# Patient Record
Sex: FEMALE | Race: OTHER | Employment: OTHER | URBAN - METROPOLITAN AREA
[De-identification: names, ages, dates, MRNs, and addresses within clinical notes are randomized per-mention and may not be internally consistent; named-entity substitution may affect disease eponyms.]

---

## 2017-03-02 ENCOUNTER — OFFICE VISIT (OUTPATIENT)
Dept: INTERNAL MEDICINE | Age: 82
End: 2017-03-02

## 2017-03-02 VITALS
TEMPERATURE: 96.5 F | SYSTOLIC BLOOD PRESSURE: 130 MMHG | HEART RATE: 64 BPM | WEIGHT: 157 LBS | HEIGHT: 60 IN | DIASTOLIC BLOOD PRESSURE: 88 MMHG | BODY MASS INDEX: 30.82 KG/M2

## 2017-03-02 DIAGNOSIS — G89.29 CHRONIC MIDLINE LOW BACK PAIN WITHOUT SCIATICA: Chronic | ICD-10-CM

## 2017-03-02 DIAGNOSIS — M54.50 CHRONIC MIDLINE LOW BACK PAIN WITHOUT SCIATICA: Chronic | ICD-10-CM

## 2017-03-02 DIAGNOSIS — E11.21 CONTROLLED TYPE 2 DIABETES MELLITUS WITH PROTEINURIC DIABETIC NEPHROPATHY (HCC): Primary | Chronic | ICD-10-CM

## 2017-03-02 LAB
GLUCOSE BLD-MCNC: 171 MG/DL
HBA1C MFR BLD: 7.9 %

## 2017-03-02 PROCEDURE — 99213 OFFICE O/P EST LOW 20 MIN: CPT | Performed by: INTERNAL MEDICINE

## 2017-03-02 PROCEDURE — 82962 GLUCOSE BLOOD TEST: CPT | Performed by: INTERNAL MEDICINE

## 2017-03-02 PROCEDURE — 83036 HEMOGLOBIN GLYCOSYLATED A1C: CPT | Performed by: INTERNAL MEDICINE

## 2017-03-02 RX ORDER — LEVOTHYROXINE SODIUM 112 UG/1
TABLET ORAL
Qty: 90 TABLET | Refills: 1 | Status: SHIPPED | OUTPATIENT
Start: 2017-03-02 | End: 2017-11-04 | Stop reason: SDUPTHER

## 2017-03-02 RX ORDER — ISOSORBIDE MONONITRATE 120 MG/1
120 TABLET, EXTENDED RELEASE ORAL DAILY
Qty: 90 TABLET | Refills: 1 | Status: SHIPPED | OUTPATIENT
Start: 2017-03-02 | End: 2019-01-23 | Stop reason: SDUPTHER

## 2017-03-02 RX ORDER — CLONIDINE HYDROCHLORIDE 0.3 MG/1
TABLET ORAL
Qty: 180 TABLET | Refills: 1 | Status: SHIPPED | OUTPATIENT
Start: 2017-03-02 | End: 2017-07-03 | Stop reason: SDUPTHER

## 2017-03-02 RX ORDER — LOSARTAN POTASSIUM 50 MG/1
50 TABLET ORAL DAILY
COMMUNITY
End: 2017-07-03 | Stop reason: ALTCHOICE

## 2017-03-02 RX ORDER — ASPIRIN 81 MG/1
TABLET ORAL
Qty: 180 TABLET | Refills: 2 | Status: SHIPPED | OUTPATIENT
Start: 2017-03-02 | End: 2020-07-02 | Stop reason: SDUPTHER

## 2017-03-02 RX ORDER — FUROSEMIDE 20 MG/1
TABLET ORAL
Qty: 90 TABLET | Refills: 1 | Status: SHIPPED | OUTPATIENT
Start: 2017-03-02 | End: 2017-11-04 | Stop reason: SDUPTHER

## 2017-03-02 ASSESSMENT — ENCOUNTER SYMPTOMS
SHORTNESS OF BREATH: 1
BOWEL INCONTINENCE: 0
ANAL BLEEDING: 0
WHEEZING: 0
BACK PAIN: 1
EYE PAIN: 0
ABDOMINAL PAIN: 0
TROUBLE SWALLOWING: 0
CHEST TIGHTNESS: 0

## 2017-04-17 LAB
CHOLESTEROL, TOTAL: 163 MG/DL
CHOLESTEROL/HDL RATIO: NORMAL
HDLC SERPL-MCNC: 50 MG/DL (ref 35–70)
LDL CHOLESTEROL CALCULATED: 79 MG/DL (ref 0–160)
TRIGL SERPL-MCNC: 170 MG/DL
VLDLC SERPL CALC-MCNC: NORMAL MG/DL

## 2017-05-04 ENCOUNTER — HOSPITAL ENCOUNTER (OUTPATIENT)
Dept: SLEEP CENTER | Age: 82
Discharge: HOME | End: 2017-05-04
Payer: COMMERCIAL

## 2017-05-04 PROCEDURE — 95810 POLYSOM 6/> YRS 4/> PARAM: CPT

## 2017-05-26 ENCOUNTER — HOSPITAL ENCOUNTER (OUTPATIENT)
Dept: SLEEP CENTER | Age: 82
Discharge: HOME | End: 2017-05-26
Payer: COMMERCIAL

## 2017-05-26 PROCEDURE — 95811 POLYSOM 6/>YRS CPAP 4/> PARM: CPT

## 2017-07-03 ENCOUNTER — OFFICE VISIT (OUTPATIENT)
Dept: INTERNAL MEDICINE | Age: 82
End: 2017-07-03

## 2017-07-03 VITALS
BODY MASS INDEX: 30.23 KG/M2 | HEART RATE: 57 BPM | OXYGEN SATURATION: 94 % | TEMPERATURE: 96.2 F | HEIGHT: 60 IN | DIASTOLIC BLOOD PRESSURE: 52 MMHG | WEIGHT: 154 LBS | SYSTOLIC BLOOD PRESSURE: 100 MMHG

## 2017-07-03 DIAGNOSIS — G89.29 CHRONIC BILATERAL LOW BACK PAIN WITHOUT SCIATICA: ICD-10-CM

## 2017-07-03 DIAGNOSIS — Z23 NEED FOR VACCINATION WITH 13-POLYVALENT PNEUMOCOCCAL CONJUGATE VACCINE: ICD-10-CM

## 2017-07-03 DIAGNOSIS — M54.50 CHRONIC BILATERAL LOW BACK PAIN WITHOUT SCIATICA: ICD-10-CM

## 2017-07-03 DIAGNOSIS — Z85.3 PERSONAL HISTORY OF MALIGNANT NEOPLASM OF BREAST: ICD-10-CM

## 2017-07-03 DIAGNOSIS — E11.21 CONTROLLED TYPE 2 DIABETES MELLITUS WITH PROTEINURIC DIABETIC NEPHROPATHY (HCC): Primary | ICD-10-CM

## 2017-07-03 DIAGNOSIS — M47.817 SPONDYLOSIS OF LUMBOSACRAL REGION WITHOUT MYELOPATHY OR RADICULOPATHY: ICD-10-CM

## 2017-07-03 DIAGNOSIS — Z90.12 S/P LEFT MASTECTOMY: ICD-10-CM

## 2017-07-03 LAB
GLUCOSE BLD-MCNC: 232 MG/DL
HBA1C MFR BLD: 7.2 %

## 2017-07-03 PROCEDURE — 83036 HEMOGLOBIN GLYCOSYLATED A1C: CPT | Performed by: INTERNAL MEDICINE

## 2017-07-03 PROCEDURE — 82962 GLUCOSE BLOOD TEST: CPT | Performed by: INTERNAL MEDICINE

## 2017-07-03 PROCEDURE — 90471 IMMUNIZATION ADMIN: CPT | Performed by: INTERNAL MEDICINE

## 2017-07-03 PROCEDURE — 99214 OFFICE O/P EST MOD 30 MIN: CPT | Performed by: INTERNAL MEDICINE

## 2017-07-03 PROCEDURE — 90670 PCV13 VACCINE IM: CPT | Performed by: INTERNAL MEDICINE

## 2017-07-03 RX ORDER — CLONIDINE HYDROCHLORIDE 0.3 MG/1
TABLET ORAL
Qty: 90 TABLET | Refills: 1
Start: 2017-07-03 | End: 2017-11-07

## 2017-07-03 ASSESSMENT — ENCOUNTER SYMPTOMS
ABDOMINAL PAIN: 0
BOWEL INCONTINENCE: 0
CHEST TIGHTNESS: 0
ANAL BLEEDING: 0
BACK PAIN: 1
EYE PAIN: 0
WHEEZING: 0
TROUBLE SWALLOWING: 0
SHORTNESS OF BREATH: 1

## 2017-07-03 ASSESSMENT — PATIENT HEALTH QUESTIONNAIRE - PHQ9
2. FEELING DOWN, DEPRESSED OR HOPELESS: 0
SUM OF ALL RESPONSES TO PHQ QUESTIONS 1-9: 0
SUM OF ALL RESPONSES TO PHQ9 QUESTIONS 1 & 2: 0
1. LITTLE INTEREST OR PLEASURE IN DOING THINGS: 0

## 2017-07-25 ENCOUNTER — HOSPITAL ENCOUNTER (OUTPATIENT)
Dept: ULTRASOUND IMAGING | Age: 82
End: 2017-07-25
Payer: COMMERCIAL

## 2017-07-25 ENCOUNTER — HOSPITAL ENCOUNTER (OUTPATIENT)
Dept: WOMENS IMAGING | Age: 82
Discharge: HOME | End: 2017-07-25
Payer: COMMERCIAL

## 2017-07-25 DIAGNOSIS — Z90.12 S/P LEFT MASTECTOMY: ICD-10-CM

## 2017-07-25 DIAGNOSIS — Z85.3 PERSONAL HISTORY OF MALIGNANT NEOPLASM OF BREAST: ICD-10-CM

## 2017-07-25 PROCEDURE — G0206 DX MAMMO INCL CAD UNI: HCPCS

## 2017-08-16 LAB
ANION GAP SERPL CALCULATED.3IONS-SCNC: 14 MEQ/L (ref 7–13)
BUN BLDV-MCNC: 22 MG/DL (ref 8–23)
CHLORIDE BLD-SCNC: 107 MEQ/L (ref 98–107)
CHOLESTEROL, TOTAL: 129 MG/DL (ref 0–199)
CO2: 25 MEQ/L (ref 22–29)
CREAT SERPL-MCNC: 1.13 MG/DL (ref 0.5–0.9)
GFR AFRICAN AMERICAN: 55.7
GFR NON-AFRICAN AMERICAN: 46
HDLC SERPL-MCNC: 60 MG/DL (ref 40–59)
LDL CHOLESTEROL CALCULATED: 40 MG/DL (ref 0–129)
POTASSIUM SERPL-SCNC: 4.8 MEQ/L (ref 3.5–5.1)
SODIUM BLD-SCNC: 146 MEQ/L (ref 132–144)
TOTAL CK: 51 U/L (ref 0–170)
TRIGL SERPL-MCNC: 143 MG/DL (ref 0–200)

## 2017-10-13 RX ORDER — FAMOTIDINE 40 MG/1
TABLET, FILM COATED ORAL
Qty: 30 TABLET | Refills: 3 | Status: SHIPPED | OUTPATIENT
Start: 2017-10-13 | End: 2018-02-08 | Stop reason: SDUPTHER

## 2017-11-03 ENCOUNTER — OFFICE VISIT (OUTPATIENT)
Dept: INTERNAL MEDICINE | Age: 82
End: 2017-11-03

## 2017-11-03 VITALS
BODY MASS INDEX: 30.04 KG/M2 | TEMPERATURE: 96.5 F | HEART RATE: 58 BPM | HEIGHT: 60 IN | SYSTOLIC BLOOD PRESSURE: 138 MMHG | DIASTOLIC BLOOD PRESSURE: 82 MMHG | WEIGHT: 153 LBS | OXYGEN SATURATION: 91 %

## 2017-11-03 DIAGNOSIS — I10 ESSENTIAL HYPERTENSION: ICD-10-CM

## 2017-11-03 DIAGNOSIS — E11.21 CONTROLLED TYPE 2 DIABETES MELLITUS WITH PROTEINURIC DIABETIC NEPHROPATHY (HCC): Primary | ICD-10-CM

## 2017-11-03 DIAGNOSIS — G89.29 ACUTE EXACERBATION OF CHRONIC LOW BACK PAIN: ICD-10-CM

## 2017-11-03 DIAGNOSIS — M54.50 ACUTE EXACERBATION OF CHRONIC LOW BACK PAIN: ICD-10-CM

## 2017-11-03 PROCEDURE — G8427 DOCREV CUR MEDS BY ELIG CLIN: HCPCS | Performed by: INTERNAL MEDICINE

## 2017-11-03 PROCEDURE — 99213 OFFICE O/P EST LOW 20 MIN: CPT | Performed by: INTERNAL MEDICINE

## 2017-11-03 PROCEDURE — 1123F ACP DISCUSS/DSCN MKR DOCD: CPT | Performed by: INTERNAL MEDICINE

## 2017-11-03 PROCEDURE — G8399 PT W/DXA RESULTS DOCUMENT: HCPCS | Performed by: INTERNAL MEDICINE

## 2017-11-03 PROCEDURE — 1090F PRES/ABSN URINE INCON ASSESS: CPT | Performed by: INTERNAL MEDICINE

## 2017-11-03 PROCEDURE — G8417 CALC BMI ABV UP PARAM F/U: HCPCS | Performed by: INTERNAL MEDICINE

## 2017-11-03 PROCEDURE — G8484 FLU IMMUNIZE NO ADMIN: HCPCS | Performed by: INTERNAL MEDICINE

## 2017-11-03 PROCEDURE — G8598 ASA/ANTIPLAT THER USED: HCPCS | Performed by: INTERNAL MEDICINE

## 2017-11-03 PROCEDURE — 1036F TOBACCO NON-USER: CPT | Performed by: INTERNAL MEDICINE

## 2017-11-03 PROCEDURE — 4040F PNEUMOC VAC/ADMIN/RCVD: CPT | Performed by: INTERNAL MEDICINE

## 2017-11-03 RX ORDER — LISINOPRIL 20 MG/1
20 TABLET ORAL 2 TIMES DAILY
Qty: 180 TABLET | Refills: 1 | Status: SHIPPED | OUTPATIENT
Start: 2017-11-03 | End: 2018-03-09 | Stop reason: SDUPTHER

## 2017-11-03 RX ORDER — AMLODIPINE BESYLATE 5 MG/1
5 TABLET ORAL DAILY
Qty: 90 TABLET | Refills: 1 | Status: SHIPPED | OUTPATIENT
Start: 2017-11-03 | End: 2018-04-29 | Stop reason: SDUPTHER

## 2017-11-03 ASSESSMENT — ENCOUNTER SYMPTOMS
ABDOMINAL PAIN: 0
BACK PAIN: 1
CHEST TIGHTNESS: 0
EYE PAIN: 0
ORTHOPNEA: 0
ANAL BLEEDING: 0
BOWEL INCONTINENCE: 0
SHORTNESS OF BREATH: 0
TROUBLE SWALLOWING: 0
BLURRED VISION: 0
WHEEZING: 0

## 2017-11-03 NOTE — PROGRESS NOTES
Manjeet Kessler is a 80 y.o. female who presents with     Chief Complaint   Patient presents with    Diabetes     random office , A1C 7.2    Back Pain    Hypertension       The following laboratory reports since the past visit were reviewed (the ones pertinent to today's visit were discussed with the patient):    Orders Only on 08/16/2017   Component Date Value Ref Range Status    BUN 08/16/2017 22  8 - 23 mg/dL Final    CREATININE 08/16/2017 1.13* 0.50 - 0.90 mg/dL Final    GFR Non- 08/16/2017 46.0* >60 Final    Comment: >60 mL/min/1.73m2 EGFR, calc. for ages 25 and older using the  MDRD formula (not corrected for weight), is valid for stable  renal function.  GFR  08/16/2017 55.7* >60 Final    Comment: >60 mL/min/1.73m2 EGFR, calc. for ages 25 and older using the  MDRD formula (not corrected for weight), is valid for stable  renal function.  Sodium 08/16/2017 146* 132 - 144 mEq/L Final    Potassium 08/16/2017 4.8  3.5 - 5.1 mEq/L Final    Chloride 08/16/2017 107  98 - 107 mEq/L Final    CO2 08/16/2017 25  22 - 29 mEq/L Final    Anion Gap 08/16/2017 14* 7 - 13 mEq/L Final   Orders Only on 08/16/2017   Component Date Value Ref Range Status    Total CK 08/16/2017 51  0 - 170 U/L Final   Orders Only on 08/16/2017   Component Date Value Ref Range Status    Cholesterol, Total 08/16/2017 129  0 - 199 mg/dL Final    Triglycerides 08/16/2017 143  0 - 200 mg/dL Final    HDL 08/16/2017 60* 40 - 59 mg/dL Final    Comment: ATP III HDL Cholesterol Classification is high. Expected Values:    Males:    >55 = No Risk            35-55 = Moderate Risk            <35 = High Risk    Females:  >65 = No Risk            45-65 = Moderate Risk            <45 = High Risk    NCEP Guidelines:   Third Report May 2001  >59 = negative risk factor for CHD  <40 = major risk factor for CHD      LDL Calculated 08/16/2017 40  0 - 129 mg/dL Final       HPI:    Diabetes   She presents dysuria, headaches, paresthesias, pelvic pain, weakness or weight loss. Risk factors include history of osteoporosis, menopause, obesity, sedentary lifestyle and lack of exercise. She has tried bed rest and NSAIDs for the symptoms. The treatment provided no relief. Hypertension   This is a chronic problem. The current episode started more than 1 year ago. The problem is unchanged. The problem is controlled. Associated symptoms include anxiety. Pertinent negatives include no blurred vision, chest pain, headaches, neck pain, orthopnea, palpitations, peripheral edema, PND or shortness of breath. Agents associated with hypertension include thyroid hormones. Risk factors for coronary artery disease include diabetes mellitus, post-menopausal state and sedentary lifestyle. Past treatments include direct vasodilators, diuretics, calcium channel blockers, central alpha agonists and ACE inhibitors. The current treatment provides significant improvement. Compliance problems include diet, psychosocial issues and exercise. Hypertensive end-organ damage includes CAD/MI. There is no history of angina, CVA or renovascular disease. There is no history of chronic renal disease or sleep apnea. More detail above in the chief complaint(s) and below in the review of systems.      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     Diabetes mellitus type II     Emphyse[facility] lung ([facility] [address]     H/O colonoscopy 10/02/2012    dr Iliana Prakash H/O mammogram ,     HTN (hypertension)     Learning disability     on SSI    Osteopenia     S/P left mastectomy 1985    cancer, no rx    Senile osteopenia     Tubular adenoma of colon        Past Surgical History:   Procedure Laterality Date     SECTION  x 3    MASTECTOMY, RADICAL Left     breast cancer       Social History     Social History    Glucose Monitoring Suppl (FREESTYLE FREEDOM LITE) W/DEVICE KIT 1 kit by Does not apply route daily 1 kit 0    Cyanocobalamin (VITAMIN B-12 PO) Take  by mouth daily.  nystatin (MYCOSTATIN) 849370 UNIT/GM ointment Apply topically 2 times daily. 30 g 1    Omega-3 Fatty Acids (OMEGA 3 PO) Take 120 mg by mouth daily.  fluticasone (FLONASE) 50 MCG/ACT nasal spray 2 sprays by Nasal route daily.  nitroGLYCERIN (NITROQUICK) 0.4 MG SL tablet Place 1 tablet under the tongue every 5 minutes as needed. 25 tablet 6    diclofenac sodium 1 % GEL Apply 2 g topically 2 times daily. Apply to affected area(s) bid as directed       Calcium Carb-Cholecalciferol (OYSTER SHELL CALCIUM + D PO) Take 1 tablet by mouth daily.  ONE TOUCH ULTRASOFT LANCETS Test once daily and as directed       vitamin D (CHOLECALCIFEROL) 1000 UNIT TABS tablet Take 1,000 Int'l Units by mouth daily. No current facility-administered medications on file prior to visit. Review of Systems   Constitutional: Positive for fatigue (exertional). Negative for activity change, appetite change, unexpected weight change and weight loss. HENT: Negative for nosebleeds and trouble swallowing. Eyes: Negative for blurred vision and pain. Respiratory: Negative for chest tightness, shortness of breath and wheezing. Cardiovascular: Negative. Negative for chest pain, palpitations, orthopnea, leg swelling and PND. Gastrointestinal: Negative for abdominal pain, anal bleeding and bowel incontinence. Endocrine: Negative for cold intolerance, heat intolerance, polydipsia and polyuria. Genitourinary: Positive for frequency. Negative for bladder incontinence, dysuria, hematuria and pelvic pain. Musculoskeletal: Positive for back pain. Negative for arthralgias, myalgias and neck pain. Skin: Negative for rash. Neurological: Positive for light-headedness (Occasional, blood pressure has been low).  Negative for dizziness, tremors, today for diabetes, back pain and hypertension. 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 Also, topical salicylate ointment should be used for massage    Essential hypertension                 Stable on medication, to focus on lifestyle especially weight loss      Other orders  -     lisinopril (PRINIVIL;ZESTRIL) 20 MG tablet; Take 1 tablet by mouth 2 times daily  -     amLODIPine (NORVASC) 5 MG tablet; Take 1 tablet by mouth daily  -     diclofenac sodium 1 % GEL; Apply 2 g topically 4 times daily as needed for Pain    Plan:    Reviewed with the patient (/and caregiver if present): current health status, medications, activities and diet. See also orders and comments in the assessment section. Today's diagnosis (in the context of chronic problems) was discussed with patient (/and caregiver if present), questions answered. Diabetes Counseling   Patient was again counseled regarding disease risks and need to maintain a healthy lifestyle, check blood sugar daily at home and keep log, watch blood pressure, caloric intake, weight and physical activity. Side effects, adverse effects of the medication prescribed (or refilled) today, as well as treatment plan/ rationale and result expectations have (again) been discussed with the patient who expresses understanding and consents to proceed as outlined above. Additional advise included in the \"after visit summary\".        Orders Placed This Encounter   Medications    lisinopril (PRINIVIL;ZESTRIL) 20 MG tablet     Sig: Take 1 tablet by mouth 2 times daily     Dispense:  180 tablet     Refill:  1    amLODIPine (NORVASC) 5 MG tablet     Sig: Take 1 tablet by mouth daily     Dispense:  90 tablet Refill:  1    diclofenac sodium 1 % GEL     Sig: Apply 2 g topically 4 times daily as needed for Pain     Dispense:  2 Tube     Refill:  3     Further workup and plan will be determined based on clinical progression and follow up test/ treatment results. t  Close follow up needed to evaluate treatment results and for care coordination. Return in about 4 months (around 3/3/2018). I have reviewed the patient's medical and surgical, family and social history, health maintenance schedule, and updated the computerized patient record. Please note this report has been partially produced by using speech recognition hardware. It may contain errors related to the system, including grammar, punctuation and spelling as well as words and phrases that may seem inaccurate. For any questions or concerns, please feel free to contact me for clarification.         Electronically signed by Kiara Romero MD

## 2017-11-03 NOTE — PATIENT INSTRUCTIONS
Patient Education        Lavanda Rideau del dolor de espalda - [ Mallory Robins About Relief for Back Pain ]  ¿Qué son la tensión y la distensión en la espalda? La distensión en la espalda ocurre cuando usted estira [place], o fuerza, un músculo de la espalda. Usted puede lesionarse la espalda en un accidente o cuando hace ejercicio o levanta algo. La mayoría de los symone de espalda mejoran con reposo y con el tiempo. Usted puede cuidarse en el hogar para ayudar a que ferraro espalda sane. ¿Qué es lo fernando que puede hacer para aliviar el dolor de espalda? Cuando empiece a sentir dolor de espalda, siga estos pasos:  · Camine. Dé un paseo corto (10 a 20 minutos) sobre prosper superficie plana (sin pendientes, donde no haya colinas o escaleras) cada 2 o 3 horas. Solo camine distancias que pueda manejar sin dolor, especialmente dolor en las piernas. · Relájese. Encuentre prosper posición cómoda para descansar. Algunas personas se sienten cómodas en el suelo o en prosper cama de firmeza media con prosper almohada pequeña debajo de la talha y otra debajo de las rodillas. Otras prefieren acostarse de lado con prosper almohada entre las rodillas. No permanezca en prosper posición por [employer]. · Pruebe con calor o hielo. Trate de [employer] almohadilla térmica a baja o media temperatura, o tome prosper ducha tibia de 15 o 20 minutos cada 2 o 3 horas. O puede comprar vendas térmicas desechables que se usan prosper sarah vez por hasta 8 horas. También puede probar compresas de hielo entre 10 y 15 minutos cada 2 o 3 horas. Puede usar prosper compresa de hielo o prosper bolsa de verduras congeladas envuelta en prosper toalla delgada. No existe evidencia sólida de que el calor o el hielo ayuden, oleg puede probarlos para zohra si son de [place]. También podría convenirle probar alternar [employer] frío y el calor. · [employer] analgésicos (medicamentos para el dolor) exactamente según las indicaciones.   ¨ Si el médico le recetó un analgésico, tómelo según causas posibles. Con frecuencia, está relacionado con problemas en los músculos y ligamentos de la espalda. También podría estar relacionado con problemas de los nervios, discos o huesos de la espalda. Moverse, levantar algo, ponerse de pie, sentarse o dormir de [employer] incómoda pueden forzar la espalda. Algunas veces no se da cuenta de que tiene prosper lesión [employer] tarde. La artritis es otra causa común del dolor de espalda. Aunque kamaljit vez duela mucho, el dolor de espalda por lo general mejora por sí mismo en varias semanas. [address] personas se recuperan en 12 semanas o menos. Hacer un buen tratamiento en el hogar y tener cuidado de no forzar la espalda puede ayudar a que se sienta mejor más pronto. La atención de seguimiento es prosper parte clave de ferraro tratamiento y seguridad. Asegúrese de hacer y acudir a todas las citas, y llame a ferraro médico si está teniendo problemas. También es prosper buena idea saber los resultados de los exámenes y mantener prosper lista de los medicamentos que valerie. ¿Cómo puede cuidarse en el hogar? · Siéntese o acuéstese en las posiciones más cómodas y que reduzcan el dolor. Trate prosper de estas posiciones al acostarse:  ¨ Acuéstese boca arriba con las rodillas dobladas y apoyadas sobre almohadones grandes. ¨ Acuéstese en el piso con las piernas sobre el asiento de un sofá o prosper silla. ¨ Acuéstese de lado con las rodillas y caderas dobladas y [place] entre las piernas. ¨ Acuéstese boca abajo siempre que esta posición no empeore el dolor. · No se siente en la cama y evite los sofás blandos y las posiciones torcidas. El reposo en cama puede aliviar el dolor al principio, oleg retrasa la sanación. Evite reposar en la cama después del primer día de dolor de espalda. · Cambie de posición cada 30 minutos. Si debe sentarse por [employer], párese y descanse de estar sentado. Levántese y camine, o acuéstese en prosper posición cómoda.   · Pruebe usar prosper almohadilla térmica a temperatura Care Instructions ]. \"     Si no tiene prosper cuenta, robinson clic en el enlace \"Sign Up Now\". Revisado: [address], 2017  Versión del contenido: 11.3  © 9369-6379 Healthwise, Incorporated. Las instrucciones de cuidado fueron adaptadas bajo licencia por [facility] ([facility]). Si usted tiene [place] afección médica o sobre estas instrucciones, siempre pregunte a ferraro profesional de boni. Healthwise, Incorporated niega toda garantía o responsabilidad por ferraro uso de esta información. Patient Education        Estiramientos de la espalda: Ejercicios - [ Back Stretches: Exercises ]  Instrucciones de cuidado  Aquí se presentan algunos ejemplos de ejercicios para estiramiento de la espalda. Empiece cada ejercicio lentamente. Reduzca la intensidad del ejercicio si Krystian Skates a tener dolor. Ferraro médico o fisioterapeuta le dirán cuándo puede comenzar con estos ejercicios y cuáles funcionarán mejor para usted. Cómo hacer los ejercicios   Estiramiento sobre la talha    1. Párese cómodamente y separe los pies a la distancia de los hombros.  2. Suzzane Brunner aderadhate, levante ambos brazos sobre ferraro Severiano Hoit y trate de alcanzar el techo. No deje que la talha se incline [place] atrás. 3. Mantenga la posición neha 15 a 30 segundos y [employer] a los lados. 4. Repita de 2 a 4 veces. Estiramiento lateral    1. Párese cómodamente y separe los pies a la distancia de los hombros.  2. Yann Dunker un brazo sobre la talha y luego inclínese hacia el otro lado. 3. Deslice ferraro mano por la pierna mientras jeremy que el peso de ferraro brazo estire suavemente los músculos laterales. Mantenga la posición entre 15 y 27 segundos. 4. Repita de 2 a 4 veces de cada lado. Lagartijas    1. Acuéstese boca abajo, apoyando ferraro cuerpo con los antebrazos. 2. Robinson presión con los codos en el piso para elevar la espalda. Al hacer esto, relaje los músculos del estómago y permita que ferraro espalda se arquee sin usar los músculos de ferraro espalda.  Al hacer presión, evite que ezekiel caderas o la pelvis se separen del piso. 3. Mantenga la posición neha 15 a 30 segundos y luego relájese. 4. Repita de 2 a 4 veces. Relajamiento y descanso    1. Acuéstese boca arriba con prosper toalla enrollada debajo de ferraro regina y [employer] almohada debajo de las rodillas. Extienda los brazos cómodamente a los lados. 2. Relájese y respire con normalidad. 3. Permanezca en esta posición neha unos 10 minutos. 4. Si quiere, puede hacer [employer] 2 y 3 veces al día. La atención de seguimiento es prosper parte clave de ferraro tratamiento y seguridad. Asegúrese de hacer y acudir a todas las citas, y llame a ferraro médico si está teniendo problemas. También es prosper buena idea saber los resultados de los exámenes y mantener prosper lista de los medicamentos que valerie. ¿Dónde puede encontrar más información en [facility]? Kalyan Browne a [email or web address] e ingrese a ferraro cuenta de MyChart. Karthikeyan Bautista [id number] en el Cloyce Javier \"Search Health Information\" para más información (en inglés) sobre \"Estiramientos de la espalda: Ejercicios - [ Back Stretches: Exercises ]. \"     Si no tiene prosper cuenta, mohamud clic en el enlace \"Sign Up Now\". Revisado: [address], 2017  Versión del contenido: 11.3  © 2913-8623 [employer], Incorporated. Las instrucciones de cuidado fueron adaptadas bajo licencia por [facility] ([facility]). Si usted tiene [place] afección médica o sobre estas instrucciones, siempre pregunte a ferraro profesional de boni. [facility], Incorporated niega toda garantía o responsabilidad por ferraro uso de esta información. Patient Education        Cómo volver a la normalidad después de un dolor en la parte baja de la espalda: Instrucciones de cuidado - [ Getting Back to Normal After Low Back Pain: Care Instructions ]  Instrucciones de cuidado  Karnia todo el isiah tiene alguna vez dolor en la parte baja de la espalda.  La buena noticia es que con algunos cuidados personales básicos, la mayoría de los symone en la parte baja de la media de la espalda    Nota: Si usted tiene dolor en la rodilla, no mohamud oleksandr ejercicio. 5. Arrodíllese en el piso y [employer] tobillos. 6. Inclínese hacia adelante, ponga betty latrell sobre el piso y estire betty brazos hacia el frente. Descanse ferraro talha entre betty brazos. 7. Empuje suavemente con ferraro pecho [employer], llegando lo más lejos hacia el frente que pueda. 8. Mantenga la posición entre 15 y 27 segundos. 9. Repita de 2 a 4 veces. Rotación de hombros    5. Siéntese cómodamente y separe los pies a la distancia de los hombros. También puede hacer oleksandr ejercicio estando de pie. 6. [place] los hombros Pax, Ayon Haydee atrás y posteriormente hacia abajo en un movimiento suave y circular. 7. Repita de 2 a 4 veces. Lagartija de pared    5. Párese frente a prosper pared, con los pies de 12 a 24 pulgadas (entre 30 y 61 centímetros) de la pared. Si siente algún dolor al hacer oleksandr ejercicio, párese más cerca de la pared. 6. Ponga betty latrell sobre la pared ligeramente más separadas que betty hombros e inclínese hacia adelante. 7. Incline ferraro cuerpo suavemente hacia la pared. Luego empuje para regresar a la posición [address] y [place] aleksandra. 8. Repita de 8 a 12 veces. Juntar los omóplatos con resistencia    Nota: Para oleksandr ejercicio, usted necesitará materiales elásticos para ejercicio, kasandra un tubo quirúrgico o [employer]ra[employer]Band. 1. Siéntese o póngase de pie, manteniendo la marie en ambas latrell al frente de usted. Mantenga betty codos cerca de los costados, doblados en un ángulo de 90 grados. Betty noemí deben estar Pax. 2. Comprima los omóplatos y [employer] brazos hacia afuera, estirando la marie. Asegúrese de que betty codos estén a betty costados mientras lo hace. 3. Relájese. 4. Repita de 8 a 12 veces. Remar con resistencia    Nota: Para oleksandr ejercicio, usted necesitará materiales elásticos para ejercicio, kasandra un tubo quirúrgico o [employer].   [facility] this, relax your stomach muscles and allow your back to arch without using your back muscles. As your press up, do not let your hips or pelvis come off the floor. 12. Hold for 15 to 30 seconds, then relax. 13. Repeat 2 to 4 times. Alternate arm and leg (bird dog) exercise    Note: Do this exercise slowly. Try to keep your body straight at all times, and do not let one hip drop lower than the other. 10. Start on the floor, on your hands and knees. 11. Tighten your belly muscles. 12. Raise one leg off the floor, and hold it straight out behind you. Be careful not to let your hip drop down, because that will twist your trunk. 13. Hold for about 6 seconds, then lower your leg and switch to the other leg. 14. Repeat 8 to 12 times on each leg. 15. Over time, work up to holding for 10 to 30 seconds each time. 16. If you feel stable and secure with your leg raised, try raising the opposite arm straight out in front of you at the same time. Knee-to-chest exercise    8. Lie on your back with your knees bent and your feet flat on the floor. 9. Bring one knee to your chest, keeping the other foot flat on the floor (or keeping the other leg straight, whichever feels better on your lower back). 10. Keep your lower back pressed to the floor. Hold for at least 15 to 30 seconds. 11. Relax, and lower the knee to the starting position. 12. Repeat with the other leg. Repeat 2 to 4 times with each leg. 13. To get more stretch, put your other leg flat on the floor while pulling your knee to your chest.  Curl-ups    9. Lie on the floor on your back with your knees bent at a 90-degree angle. Your feet should be flat on the floor, about 12 inches from your buttocks. 10. Cross your arms over your chest. If this bothers your neck, try putting your hands behind your neck (not your head), with your elbows spread apart. 11. Slowly tighten your belly muscles and raise your shoulder blades off the floor.   12. Keep your head in line with your body, and do not press your chin to your chest.  13. Hold this position for 1 or 2 seconds, then slowly lower yourself back down to the floor. 14. Repeat 8 to 12 times. Pelvic tilt exercise    5. Lie on your back with your knees bent. 6. \"Brace\" your stomach. This means to tighten your muscles by pulling in and imagining your belly button moving toward your spine. You should feel like your back is pressing to the floor and your hips and pelvis are rocking back. 7. Hold for about 6 seconds while you breathe smoothly. 8. Repeat 8 to 12 times. Heel dig bridging    4. Lie on your back with both knees bent and your ankles bent so that only your heels are digging into the floor. Your knees should be bent about 90 degrees. 5. Then push your heels into the floor, squeeze your buttocks, and lift your hips off the floor until your shoulders, hips, and knees are all in a straight line. 6. Hold for about 6 seconds as you continue to breathe normally, and then slowly lower your hips back down to the floor and rest for up to 10 seconds. 7. Do 8 to 12 repetitions. Hamstring stretch in doorway    1. Lie on your back in a doorway, with one leg through the open door. 2. Slide your leg up the wall to straighten your knee. You should feel a gentle stretch down the back of your leg. 3. Hold the stretch for at least 15 to 30 seconds. Do not arch your back, point your toes, or bend either knee. Keep one heel touching the floor and the other heel touching the wall. 4. Repeat with your other leg. 5. Do 2 to 4 times for each leg. Hip flexor stretch    1. Kneel on the floor with one knee bent and one leg behind you. Place your forward knee over your foot. Keep your other knee touching the floor. 2. Slowly push your hips forward until you feel a stretch in the upper thigh of your rear leg. 3. Hold the stretch for at least 15 to 30 seconds. Repeat with your other leg. 4. Do 2 to 4 times on each side.   Elmer Godoy sit    1. Stand with your back 10 to 12 inches away from a wall. 2. Lean into the wall until your back is flat against it. 3. Slowly slide down until your knees are slightly bent, pressing your lower back into the wall. 4. Hold for about 6 seconds, then slide back up the wall. 5. Repeat 8 to 12 times. Follow-up care is a key part of your treatment and safety. Be sure to make and go to all appointments, and call your doctor if you are having problems. It's also a good idea to know your test results and keep a list of the medicines you take. Where can you learn more? Go to [email or web address] and sign in to your [employer] account. Enter W619 in the [employer]Place box to learn more about \"Low Back Pain: Exercises. \"     If you do not have an account, please click on the \"Sign Up Now\" link. Current as of: March 21, 2017  Content Version: 11.3  © 0015-0092 [employer]. Care instructions adapted under license by [facility] ([facility]). If you have questions about a medical condition or this instruction, always ask your healthcare professional. [place] [address] any warranty or liability for your use of this information. Patient Education        Dolor alfonso en la parte baja de la espalda: Ejercicios - [ Acute Low Back Pain: Exercises ]  Instrucciones de cuidado  Éstos son algunos ejemplos de ejercicios típicos de rehabilitación para ferraro afección. Comience cada ejercicio lentamente. Reduzca la intensidad del ejercicio si Rondal Book a sentir dolor. Ferraro médico o el fisioterapeuta le dirán cuándo puede comenzar con estos ejercicios y cuáles funcionarán mejor para usted. Cuando no esté activo, encuentre prosper posición cómoda para descansar. Algunas personas se sienten cómodas en el piso o en prosper cama de firmeza media con prosper almohada pequeña debajo de la talha y otra debajo de ezekiel rodillas. Otras personas prefieren acostarse de lado con prosper almohada entre las rodillas.

## 2017-11-07 RX ORDER — LEVOTHYROXINE SODIUM 112 UG/1
TABLET ORAL
Qty: 90 TABLET | Refills: 1 | Status: SHIPPED | OUTPATIENT
Start: 2017-11-07 | End: 2018-03-09 | Stop reason: SDUPTHER

## 2017-11-07 RX ORDER — FUROSEMIDE 20 MG/1
TABLET ORAL
Qty: 90 TABLET | Refills: 1 | Status: SHIPPED | OUTPATIENT
Start: 2017-11-07 | End: 2018-03-09 | Stop reason: SDUPTHER

## 2017-11-07 RX ORDER — CLONIDINE HYDROCHLORIDE 0.3 MG/1
TABLET ORAL
Qty: 180 TABLET | Refills: 1 | Status: SHIPPED | OUTPATIENT
Start: 2017-11-07 | End: 2018-03-09 | Stop reason: SDUPTHER

## 2018-02-08 RX ORDER — FAMOTIDINE 40 MG/1
TABLET, FILM COATED ORAL
Qty: 30 TABLET | Refills: 3 | Status: SHIPPED | OUTPATIENT
Start: 2018-02-08 | End: 2018-06-11 | Stop reason: SDUPTHER

## 2018-03-09 ENCOUNTER — OFFICE VISIT (OUTPATIENT)
Dept: INTERNAL MEDICINE CLINIC | Age: 83
End: 2018-03-09
Payer: COMMERCIAL

## 2018-03-09 VITALS
DIASTOLIC BLOOD PRESSURE: 72 MMHG | HEART RATE: 70 BPM | OXYGEN SATURATION: 96 % | WEIGHT: 158 LBS | BODY MASS INDEX: 31.02 KG/M2 | HEIGHT: 60 IN | SYSTOLIC BLOOD PRESSURE: 157 MMHG

## 2018-03-09 DIAGNOSIS — M21.619 BUNION OF GREAT TOE: ICD-10-CM

## 2018-03-09 DIAGNOSIS — S76.211A STRAIN OF ADDUCTOR MAGNUS MUSCLE OF RIGHT LOWER EXTREMITY, INITIAL ENCOUNTER: ICD-10-CM

## 2018-03-09 LAB
GLUCOSE BLD-MCNC: 212 MG/DL
HBA1C MFR BLD: 8.5 %

## 2018-03-09 PROCEDURE — G8427 DOCREV CUR MEDS BY ELIG CLIN: HCPCS | Performed by: INTERNAL MEDICINE

## 2018-03-09 PROCEDURE — G8598 ASA/ANTIPLAT THER USED: HCPCS | Performed by: INTERNAL MEDICINE

## 2018-03-09 PROCEDURE — 1036F TOBACCO NON-USER: CPT | Performed by: INTERNAL MEDICINE

## 2018-03-09 PROCEDURE — 83036 HEMOGLOBIN GLYCOSYLATED A1C: CPT | Performed by: INTERNAL MEDICINE

## 2018-03-09 PROCEDURE — 99214 OFFICE O/P EST MOD 30 MIN: CPT | Performed by: INTERNAL MEDICINE

## 2018-03-09 PROCEDURE — 1123F ACP DISCUSS/DSCN MKR DOCD: CPT | Performed by: INTERNAL MEDICINE

## 2018-03-09 PROCEDURE — G8484 FLU IMMUNIZE NO ADMIN: HCPCS | Performed by: INTERNAL MEDICINE

## 2018-03-09 PROCEDURE — G8399 PT W/DXA RESULTS DOCUMENT: HCPCS | Performed by: INTERNAL MEDICINE

## 2018-03-09 PROCEDURE — 1090F PRES/ABSN URINE INCON ASSESS: CPT | Performed by: INTERNAL MEDICINE

## 2018-03-09 PROCEDURE — G8417 CALC BMI ABV UP PARAM F/U: HCPCS | Performed by: INTERNAL MEDICINE

## 2018-03-09 PROCEDURE — 82962 GLUCOSE BLOOD TEST: CPT | Performed by: INTERNAL MEDICINE

## 2018-03-09 PROCEDURE — 4040F PNEUMOC VAC/ADMIN/RCVD: CPT | Performed by: INTERNAL MEDICINE

## 2018-03-09 RX ORDER — FUROSEMIDE 20 MG/1
TABLET ORAL
Qty: 90 TABLET | Refills: 1 | Status: SHIPPED | OUTPATIENT
Start: 2018-03-09 | End: 2018-10-16 | Stop reason: SDUPTHER

## 2018-03-09 RX ORDER — LEVOTHYROXINE SODIUM 112 UG/1
TABLET ORAL
Qty: 90 TABLET | Refills: 1 | Status: SHIPPED | OUTPATIENT
Start: 2018-03-09 | End: 2018-10-16 | Stop reason: SDUPTHER

## 2018-03-09 RX ORDER — CLONIDINE HYDROCHLORIDE 0.3 MG/1
TABLET ORAL
Qty: 180 TABLET | Refills: 1 | Status: SHIPPED | OUTPATIENT
Start: 2018-03-09 | End: 2018-10-16 | Stop reason: SDUPTHER

## 2018-03-09 RX ORDER — LISINOPRIL 20 MG/1
20 TABLET ORAL 2 TIMES DAILY
Qty: 180 TABLET | Refills: 1 | Status: SHIPPED | OUTPATIENT
Start: 2018-03-09 | End: 2018-10-16 | Stop reason: SDUPTHER

## 2018-03-09 RX ORDER — PRAVASTATIN SODIUM 40 MG
TABLET ORAL
Qty: 90 TABLET | Refills: 1 | Status: SHIPPED | OUTPATIENT
Start: 2018-03-09 | End: 2018-10-16 | Stop reason: SDUPTHER

## 2018-03-09 ASSESSMENT — ENCOUNTER SYMPTOMS
RESPIRATORY NEGATIVE: 1
CHEST TIGHTNESS: 0
GASTROINTESTINAL NEGATIVE: 1
TROUBLE SWALLOWING: 0
ANAL BLEEDING: 0
SHORTNESS OF BREATH: 0
WHEEZING: 0
BACK PAIN: 1
EYE PAIN: 0
CHOKING: 0
ABDOMINAL PAIN: 0

## 2018-03-09 NOTE — PROGRESS NOTES
Jodi Bryant is a 80 y.o. female nonsmoker with coronary artery disease, breast cancer history, cholelithiasis, who presents with     Chief Complaint   Patient presents with    Diabetes     random office , A1C 8.5    Leg Pain     inner right thigh x a couple of weeks, with certain movements, no falls or other trauma, knees hurt as well     Since the past office visit, the patient has not been in the emergency room or hospital.  She states she stopped checking her blood sugar at home because she feels well. The following laboratory reports since the past visit were reviewed (the ones pertinent to today's visit were discussed with the patient):    Office Visit on 03/09/2018   Component Date Value Ref Range Status    Hemoglobin A1C 03/09/2018 8.5  % Final    Glucose 03/09/2018 212  mg/dL Final       HPI:    Diabetes   She presents for her follow-up diabetic visit. She has type 2 diabetes mellitus. Her disease course has been fluctuating. There are no hypoglycemic associated symptoms. Pertinent negatives for hypoglycemia include no confusion, dizziness, headaches, nervousness/anxiousness or tremors. There are no diabetic associated symptoms. Pertinent negatives for diabetes include no chest pain, no fatigue, no foot paresthesias, no foot ulcerations, no polydipsia, no polyuria, no weakness and no weight loss. There are no hypoglycemic complications. Diabetic complications include nephropathy. Pertinent negatives for diabetic complications include no CVA or peripheral neuropathy. Risk factors for coronary artery disease include post-menopausal, sedentary lifestyle, hypertension, diabetes mellitus and obesity. Current diabetic treatment includes oral agent (monotherapy). She is compliant with treatment most of the time. Her weight is fluctuating minimally. She is following a generally healthy diet. Meal planning includes avoidance of concentrated sweets.  She has not had a previous visit with a brother stayed in [facility]    , homemaker, on [place] for learning disability    3 daughters, several grandchildren    Has large family, plenty of support    Plans to travel to [facility] to see siblings (2015, after 61 years)       Family History   Problem Relation Age of Onset   [facility] Diabetes Mother      dec age 80       Family and social history were reviewed and pertinent changes documented. ALLERGIES    Patient has no known allergies. Current Outpatient Prescriptions on File Prior to Visit   Medication Sig Dispense Refill    famotidine (PEPCID) 40 MG tablet take 1 tablet by mouth at bedtime if needed for HEARTBURN 30 tablet 3    amLODIPine (NORVASC) 5 MG tablet Take 1 tablet by mouth daily 90 tablet 1    diclofenac sodium 1 % GEL Apply 2 g topically 4 times daily as needed for Pain 2 Tube 3    umeclidinium-vilanterol (ANORO ELLIPTA) 62.5-25 MCG/INH AEPB inhaler Inhale 1 puff into the lungs daily      isosorbide mononitrate (IMDUR) 120 MG extended release tablet Take 1 tablet by mouth daily 90 tablet 1    aspirin (RA ASPIRIN ADULT LOW STRENGTH) 81 MG EC tablet take 2 tablets by mouth once daily 180 tablet 2    Cyanocobalamin (VITAMIN B-12 PO) Take  by mouth daily.  nystatin (MYCOSTATIN) 861598 UNIT/GM ointment Apply topically 2 times daily. 30 g 1    Omega-3 Fatty Acids (OMEGA 3 PO) Take 120 mg by mouth daily.  fluticasone (FLONASE) 50 MCG/ACT nasal spray 2 sprays by Nasal route daily.  nitroGLYCERIN (NITROQUICK) 0.4 MG SL tablet Place 1 tablet under the tongue every 5 minutes as needed. 25 tablet 6    diclofenac sodium 1 % GEL Apply 2 g topically 2 times daily. Apply to affected area(s) bid as directed       Calcium Carb-Cholecalciferol (OYSTER SHELL CALCIUM + D PO) Take 1 tablet by mouth daily.  ONE TOUCH ULTRASOFT LANCETS Test once daily and as directed       vitamin D (CHOLECALCIFEROL) 1000 UNIT TABS tablet Take 1,000 Int'l Units by mouth daily.          No current facility-administered medications on file prior to visit. Review of Systems   Constitutional: Negative. Negative for activity change, appetite change, fatigue, unexpected weight change and weight loss. HENT: Negative for nosebleeds and trouble swallowing. Eyes: Negative for pain. Respiratory: Negative. Negative for choking, chest tightness, shortness of breath and wheezing. Cardiovascular: Negative. Negative for chest pain, palpitations and leg swelling. Gastrointestinal: Negative. Negative for abdominal pain and anal bleeding. Endocrine: Negative. Negative for cold intolerance, heat intolerance, polydipsia and polyuria. Genitourinary: Positive for frequency. Negative for dysuria, hematuria and pelvic pain. Musculoskeletal: Positive for arthralgias (knees) and back pain. Negative for myalgias, neck pain and neck stiffness. Skin: Negative for rash. Neurological: Negative for dizziness, tingling, tremors, weakness, light-headedness, numbness and headaches. Psychiatric/Behavioral: Positive for sleep disturbance (sleep loss). Negative for confusion, decreased concentration and dysphoric mood. The patient is not nervous/anxious. Vitals:    03/09/18 1046 03/09/18 1053   BP: (!) 143/76 (!) 157/72   Site: Right Arm Right Arm   Position: Sitting Sitting   Cuff Size: Medium Adult Medium Adult   Pulse: 70    SpO2: 96%    Weight: 158 lb (71.7 kg)    Height: 5' (1.524 m)        Physical Exam   Constitutional: She is oriented to person, place, and time. No distress. Obese, age appropriate, well groomed     HENT:   Head: Atraumatic. Eyes: Conjunctivae are normal.   Neck: Normal range of motion. Neck supple. No JVD present. No thyromegaly present. Cardiovascular: Regular rhythm and normal heart sounds. Exam reveals no gallop. Pulmonary/Chest: Effort normal. No respiratory distress. She has no rales. Diminished breath sounds bilaterally     Abdominal: Soft.  She exhibits no distension. There is no tenderness. Musculoskeletal:        Right hip: She exhibits normal range of motion, normal strength and no tenderness. Left hip: She exhibits normal range of motion, normal strength and no tenderness. Right knee: She exhibits normal range of motion and no effusion. Tenderness found. Medial joint line tenderness noted. Left knee: She exhibits normal range of motion and no effusion. Tenderness found. Medial joint line tenderness noted. Legs:  There is tenderness with abduction of the right thigh and with palpation of the right thigh adductor group, this reproduces patient's pain    Foot exam reveals small bilateral bunion deformities of the halluces, good dorsalis pedis and tibialis posterior pulses, onychomycosis but no interdigital dermatitis, no calluses or ulcerations. Sensation as tested with filament, and position sense, are preserved in both feet. Lymphadenopathy:     She has no cervical adenopathy. Neurological: She is alert and oriented to person, place, and time. Coordination normal.   Stance, gait well balanced and stable, Romberg negative. No focal motor neurological deficits. Skin: Skin is warm. No rash noted. Psychiatric: Her speech is normal and behavior is normal. Her mood appears not anxious. She is not slowed and not withdrawn. Cognition and memory are normal. She does not express inappropriate judgment. She does not exhibit a depressed mood. She exhibits normal recent memory and normal remote memory. Good motivation, insight She is attentive. Assessment:    Frederic Vasquez was seen today for diabetes and leg pain.     Diagnoses and all orders for this visit:    Uncontrolled type 2 diabetes mellitus with nephropathy ([facility] [address]  Comments:  Metformin increased to 500 twice a day  Patient counseled on focusing on diet and exercise  Resume daily blood sugar monitoring, call if glucose 180 or above  Repeat A1c in 3 months  Orders:  -     POCT

## 2018-04-18 LAB
CHOLESTEROL, TOTAL: 190 MG/DL (ref 0–199)
HDLC SERPL-MCNC: 56 MG/DL (ref 40–59)
LDL CHOLESTEROL CALCULATED: 93 MG/DL (ref 0–129)
TOTAL CK: 43 U/L (ref 0–170)
TRIGL SERPL-MCNC: 203 MG/DL (ref 0–200)

## 2018-04-30 RX ORDER — AMLODIPINE BESYLATE 5 MG/1
TABLET ORAL
Qty: 90 TABLET | Refills: 1 | Status: SHIPPED | OUTPATIENT
Start: 2018-04-30 | End: 2019-01-23 | Stop reason: SDUPTHER

## 2018-05-16 LAB
ANION GAP SERPL CALCULATED.3IONS-SCNC: 15 MEQ/L (ref 7–13)
BUN BLDV-MCNC: 25 MG/DL (ref 8–23)
CHLORIDE BLD-SCNC: 105 MEQ/L (ref 98–107)
CHOLESTEROL, TOTAL: 139 MG/DL (ref 0–199)
CO2: 24 MEQ/L (ref 22–29)
CREAT SERPL-MCNC: 1.21 MG/DL (ref 0.5–0.9)
GFR AFRICAN AMERICAN: 51.4
GFR NON-AFRICAN AMERICAN: 42.4
HDLC SERPL-MCNC: 59 MG/DL (ref 40–59)
LDL CHOLESTEROL CALCULATED: 57 MG/DL (ref 0–129)
POTASSIUM SERPL-SCNC: 4.8 MEQ/L (ref 3.5–5.1)
SODIUM BLD-SCNC: 144 MEQ/L (ref 132–144)
TOTAL CK: 56 U/L (ref 0–170)
TRIGL SERPL-MCNC: 114 MG/DL (ref 0–200)

## 2018-06-11 RX ORDER — FAMOTIDINE 40 MG/1
TABLET, FILM COATED ORAL
Qty: 30 TABLET | Refills: 3 | Status: SHIPPED | OUTPATIENT
Start: 2018-06-11 | End: 2018-06-15

## 2018-06-15 ENCOUNTER — OFFICE VISIT (OUTPATIENT)
Dept: INTERNAL MEDICINE CLINIC | Age: 83
End: 2018-06-15
Payer: COMMERCIAL

## 2018-06-15 VITALS
TEMPERATURE: 96.6 F | DIASTOLIC BLOOD PRESSURE: 80 MMHG | BODY MASS INDEX: 30.04 KG/M2 | SYSTOLIC BLOOD PRESSURE: 120 MMHG | OXYGEN SATURATION: 94 % | WEIGHT: 153 LBS | HEIGHT: 60 IN | HEART RATE: 66 BPM

## 2018-06-15 DIAGNOSIS — E11.21 CONTROLLED TYPE 2 DIABETES MELLITUS WITH PROTEINURIC DIABETIC NEPHROPATHY (HCC): Primary | Chronic | ICD-10-CM

## 2018-06-15 DIAGNOSIS — K21.9 HIATAL HERNIA WITH GASTROESOPHAGEAL REFLUX: Chronic | ICD-10-CM

## 2018-06-15 DIAGNOSIS — K44.9 HIATAL HERNIA WITH GASTROESOPHAGEAL REFLUX: Chronic | ICD-10-CM

## 2018-06-15 LAB
GLUCOSE BLD-MCNC: 146 MG/DL
HBA1C MFR BLD: 7.7 %

## 2018-06-15 PROCEDURE — G8427 DOCREV CUR MEDS BY ELIG CLIN: HCPCS | Performed by: INTERNAL MEDICINE

## 2018-06-15 PROCEDURE — 1036F TOBACCO NON-USER: CPT | Performed by: INTERNAL MEDICINE

## 2018-06-15 PROCEDURE — 1090F PRES/ABSN URINE INCON ASSESS: CPT | Performed by: INTERNAL MEDICINE

## 2018-06-15 PROCEDURE — G8417 CALC BMI ABV UP PARAM F/U: HCPCS | Performed by: INTERNAL MEDICINE

## 2018-06-15 PROCEDURE — 82962 GLUCOSE BLOOD TEST: CPT | Performed by: INTERNAL MEDICINE

## 2018-06-15 PROCEDURE — 99213 OFFICE O/P EST LOW 20 MIN: CPT | Performed by: INTERNAL MEDICINE

## 2018-06-15 PROCEDURE — 4040F PNEUMOC VAC/ADMIN/RCVD: CPT | Performed by: INTERNAL MEDICINE

## 2018-06-15 PROCEDURE — 1123F ACP DISCUSS/DSCN MKR DOCD: CPT | Performed by: INTERNAL MEDICINE

## 2018-06-15 PROCEDURE — G8598 ASA/ANTIPLAT THER USED: HCPCS | Performed by: INTERNAL MEDICINE

## 2018-06-15 PROCEDURE — 83036 HEMOGLOBIN GLYCOSYLATED A1C: CPT | Performed by: INTERNAL MEDICINE

## 2018-06-15 PROCEDURE — G8399 PT W/DXA RESULTS DOCUMENT: HCPCS | Performed by: INTERNAL MEDICINE

## 2018-06-15 RX ORDER — FAMOTIDINE 40 MG/1
40 TABLET, FILM COATED ORAL NIGHTLY PRN
Qty: 90 TABLET | Refills: 1 | Status: SHIPPED | OUTPATIENT
Start: 2018-06-15 | End: 2018-10-16 | Stop reason: SDUPTHER

## 2018-06-15 ASSESSMENT — ENCOUNTER SYMPTOMS
ABDOMINAL PAIN: 0
COUGH: 0
HEARTBURN: 1
ANAL BLEEDING: 0
SHORTNESS OF BREATH: 0
HOARSE VOICE: 0
EYE PAIN: 0
TROUBLE SWALLOWING: 0
WHEEZING: 0
BACK PAIN: 1
CHEST TIGHTNESS: 0
CHOKING: 0

## 2018-06-25 LAB
ANION GAP SERPL CALCULATED.3IONS-SCNC: 15 MEQ/L (ref 7–13)
BUN BLDV-MCNC: 28 MG/DL (ref 8–23)
CHLORIDE BLD-SCNC: 103 MEQ/L (ref 98–107)
CO2: 25 MEQ/L (ref 22–29)
CREAT SERPL-MCNC: 1.39 MG/DL (ref 0.5–0.9)
GFR AFRICAN AMERICAN: 43.7
GFR NON-AFRICAN AMERICAN: 36.1
POTASSIUM SERPL-SCNC: 4.7 MEQ/L (ref 3.5–5.1)
SODIUM BLD-SCNC: 143 MEQ/L (ref 132–144)

## 2018-07-19 LAB
CHOLESTEROL, TOTAL: 168 MG/DL (ref 0–199)
HDLC SERPL-MCNC: 50 MG/DL (ref 40–59)
LDL CHOLESTEROL CALCULATED: 89 MG/DL (ref 0–129)
TOTAL CK: 51 U/L (ref 0–170)
TRIGL SERPL-MCNC: 144 MG/DL (ref 0–200)

## 2018-08-03 LAB
ANION GAP SERPL CALCULATED.3IONS-SCNC: 10 MEQ/L (ref 7–13)
BUN BLDV-MCNC: 14 MG/DL (ref 8–23)
CHLORIDE BLD-SCNC: 104 MEQ/L (ref 98–107)
CO2: 24 MEQ/L (ref 22–29)
CREAT SERPL-MCNC: 1.03 MG/DL (ref 0.5–0.9)
GFR AFRICAN AMERICAN: >60
GFR NON-AFRICAN AMERICAN: 51.1
POTASSIUM SERPL-SCNC: 5 MEQ/L (ref 3.5–5.1)
SODIUM BLD-SCNC: 138 MEQ/L (ref 132–144)

## 2018-08-09 ENCOUNTER — CARE COORDINATION (OUTPATIENT)
Dept: CARE COORDINATION | Age: 83
End: 2018-08-09

## 2018-08-09 ENCOUNTER — OFFICE VISIT (OUTPATIENT)
Dept: INTERNAL MEDICINE CLINIC | Age: 83
End: 2018-08-09
Payer: COMMERCIAL

## 2018-08-09 ENCOUNTER — HOSPITAL ENCOUNTER (OUTPATIENT)
Dept: GENERAL RADIOLOGY | Age: 83
Discharge: HOME | End: 2018-08-11
Payer: COMMERCIAL

## 2018-08-09 ENCOUNTER — TELEPHONE (OUTPATIENT)
Dept: INTERNAL MEDICINE CLINIC | Age: 83
End: 2018-08-09

## 2018-08-09 VITALS
BODY MASS INDEX: 31.02 KG/M2 | TEMPERATURE: 95.6 F | RESPIRATION RATE: 14 BRPM | DIASTOLIC BLOOD PRESSURE: 72 MMHG | HEIGHT: 60 IN | HEART RATE: 64 BPM | WEIGHT: 158 LBS | SYSTOLIC BLOOD PRESSURE: 144 MMHG

## 2018-08-09 DIAGNOSIS — M25.562 CHRONIC PAIN OF LEFT KNEE: Primary | ICD-10-CM

## 2018-08-09 DIAGNOSIS — E11.21 CONTROLLED TYPE 2 DIABETES MELLITUS WITH PROTEINURIC DIABETIC NEPHROPATHY (HCC): ICD-10-CM

## 2018-08-09 DIAGNOSIS — G89.29 CHRONIC PAIN OF LEFT KNEE: Primary | ICD-10-CM

## 2018-08-09 DIAGNOSIS — M25.562 CHRONIC PAIN OF LEFT KNEE: ICD-10-CM

## 2018-08-09 DIAGNOSIS — H11.31 SUBCONJUNCTIVAL HEMORRHAGE OF RIGHT EYE: ICD-10-CM

## 2018-08-09 DIAGNOSIS — G89.29 CHRONIC PAIN OF LEFT KNEE: ICD-10-CM

## 2018-08-09 PROCEDURE — 99214 OFFICE O/P EST MOD 30 MIN: CPT | Performed by: FAMILY MEDICINE

## 2018-08-09 PROCEDURE — 73560 X-RAY EXAM OF KNEE 1 OR 2: CPT

## 2018-08-09 RX ORDER — EZETIMIBE 10 MG/1
10 TABLET ORAL DAILY
COMMUNITY

## 2018-08-09 RX ORDER — LIDOCAINE 50 MG/G
1 PATCH TOPICAL DAILY
Qty: 30 PATCH | Refills: 0 | Status: SHIPPED | OUTPATIENT
Start: 2018-08-09

## 2018-08-09 ASSESSMENT — PATIENT HEALTH QUESTIONNAIRE - PHQ9
1. LITTLE INTEREST OR PLEASURE IN DOING THINGS: 0
SUM OF ALL RESPONSES TO PHQ9 QUESTIONS 1 & 2: 0
SUM OF ALL RESPONSES TO PHQ QUESTIONS 1-9: 0
2. FEELING DOWN, DEPRESSED OR HOPELESS: 0
SUM OF ALL RESPONSES TO PHQ QUESTIONS 1-9: 0

## 2018-08-09 NOTE — CARE COORDINATION
Call placed to patient to provided information regarding transportation services for medical appointments at request of Dr. Sara Tinoco MD.  Meggannasir Caballero with patient that asked that I speak with her daughter. Informed daughter that patient has transportation services available through insurance ([address] [place] [address] [employer]). Informed her that typically insurance provides [phone] round trips to medical appointments. Instructed that the number to schedule transportation is the Member Services number on the back of insurance card. Instructed to follow the prompts to schedule transportation. Provided the toll free number to daughter. Informed daughter that [facility] provides transportation services for individuals 61 + that have no other source for transportation. Informed her that they require 14 days in advance notice. Provided phone number. Daughter asked how to schedule outpatient therapy appointment. Informed her that Outpatient Therapy will contact her to schedule. Directed her to make sure that the appointment is scheduled to provide the 48 hour/2 business day requirement.

## 2018-08-09 NOTE — PROGRESS NOTES
Take 10 mg by mouth daily      metFORMIN (GLUCOPHAGE) 500 MG tablet Take 1 tablet by mouth 2 times daily (with meals) 180 tablet 1    lidocaine (LIDODERM) 5 % Place 1 patch onto the skin daily 12 hours on, 12 hours off. 30 patch 0    famotidine (PEPCID) 40 MG tablet Take 1 tablet by mouth nightly as needed (heartburn) 90 tablet 1    amLODIPine (NORVASC) 5 MG tablet take 1 tablet by mouth once daily 90 tablet 1    levothyroxine (SYNTHROID) 112 MCG tablet take 1 tablet by mouth every morning BEFORE A MEAL 90 tablet 1    furosemide (LASIX) 20 MG tablet take 1 tablet by mouth once daily 90 tablet 1    cloNIDine (CATAPRES) 0.3 MG tablet take 1 tablet by mouth twice a day 180 tablet 1    pravastatin (PRAVACHOL) 40 MG tablet Take 1 tablet by mouth once daily 90 tablet 1    lisinopril (PRINIVIL;ZESTRIL) 20 MG tablet Take 1 tablet by mouth 2 times daily 180 tablet 1    umeclidinium-vilanterol (ANORO ELLIPTA) 62.5-25 MCG/INH AEPB inhaler Inhale 1 puff into the lungs daily      isosorbide mononitrate (IMDUR) 120 MG extended release tablet Take 1 tablet by mouth daily 90 tablet 1    aspirin (RA ASPIRIN ADULT LOW STRENGTH) 81 MG EC tablet take 2 tablets by mouth once daily 180 tablet 2    Cyanocobalamin (VITAMIN B-12 PO) Take  by mouth daily.  Omega-3 Fatty Acids (OMEGA 3 PO) Take 120 mg by mouth daily.  fluticasone (FLONASE) 50 MCG/ACT nasal spray 2 sprays by Nasal route daily.  nitroGLYCERIN (NITROQUICK) 0.4 MG SL tablet Place 1 tablet under the tongue every 5 minutes as needed. 25 tablet 6    Calcium Carb-Cholecalciferol (OYSTER SHELL CALCIUM + D PO) Take 1 tablet by mouth daily.  ONE TOUCH ULTRASOFT LANCETS Test once daily and as directed       vitamin D (CHOLECALCIFEROL) 1000 UNIT TABS tablet Take 1,000 Int'l Units by mouth daily. No current facility-administered medications for this visit.          ROS  CONSTITUTIONAL: The patient denies fevers, chills, sweats and body ache.  HEENT: Denies headache, blurry vision, eye pain, tinnitus, vertigo,  sore throat, neck or thyroid masses. Redness in right eye. RESPIRATORY: Denies cough, sputum, hemoptysis. CARDIAC: Denies chest pain, pressure, palpitations, Denies lower extremity edema. GASTROINTESTINAL: Denies abdominal pain, constipation, diarrhea, bleeding in the stools,   GENITOURINARY: Denies dysuria, hematuria, nocturia or frequency, urinary incontinence. NEUROLOGIC: Denies headaches, dizziness, syncope, weakness  MUSCULOSKELETAL: Left knee pain. ENDOCRINOLOGY: Denies heat or cold intolerance. HEMATOLOGY: Denies easy bleeding or blood transfusion,anemia  DERMATOLOGY: Denies changes in moles or pigmentation changes. PSYCHIATRY: Denies depression, agitation or anxiety.     Past Medical History:   Diagnosis Date    Bilateral bunions 2018    CAD (coronary artery disease)     [facility]    Cholelithiasis 2015    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     Diabetes mellitus type II     Emphysema lung ([facility] [address]     H/O colonoscopy 10/02/2012    dr Anne-Marie Reynolds H/O mammogram ,     HTN (hypertension)     Learning disability     on SSI    Osteopenia 2015    S/P left mastectomy 1985    cancer, no rx    Senile osteopenia     Tubular adenoma of colon         Past Surgical History:   Procedure Laterality Date     SECTION  x 3    MASTECTOMY, RADICAL Left     breast cancer        Family History   Problem Relation Age of Onset    Diabetes Mother         dec age 80        Social History     Social History    Marital status:      Spouse name: N/A    Number of children: 1    Years of education: N/A     Occupational History    homemaker, SSI for learning disability      Social History Main Topics    Smoking status: Former Smoker     Quit date: 4/10/2007    Smokeless tobacco: Never Used    Alcohol use No    Drug use: No    Sexual activity: Not on file     Other Topics Concern    Not on file     Social History Narrative    Born in [facility], older of 3 children, came to the [address] [place] [address] at age 23, sister and brother stayed in [facility]    , homemaker, 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 Ears - bilateral TM's and external ear canals normal   Nose - normal and patent, no erythema, discharge or polyps   Sinuses - Normal paranasal sinuses without tenderness   Throat - mucous membranes moist, pharynx normal without lesions   Neck - supple, no significant adenopathy   Thyroid - thyroid is normal in size without nodules or tenderness    Chest - clear to auscultation, no wheezes, rales or rhonchi, symmetric air entry   Heart - normal rate, regular rhythm, normal S1, S2, no murmurs, rubs, clicks or gallops  Abdomen - soft, nontender, nondistended, no masses or organomegaly   Extremities - peripheral pulses normal, no pedal edema, no clubbing or cyanosis   Skin - normal coloration and turgor, no rashes, no suspicious skin lesions noted  Knee exam: Both knees look similar impairments. Left knee there is tenderness to palpation along the lateral joint line. There is also tenderness to palpation along the medial joint line. Decrease range of motion in the left knee. No calf tenderness or palpable cord. No signs of erythema or effusion. Strength and power is intact.   Reflexes normal.  Popliteal pulse palpated        Labs   No results found for: TSHREFLEX  TSH   Date Value Ref Range Status   01/15/2015 3.880 0.270 - 4.200 uIU/mL Final   07/03/2013 2.095 0.550 - 4.780 uIU/mL Final     Imaging 9/30/15  EXAMINATION LEFT FOOT, 3 VIEWS       CLINICAL HISTORY LEFT FOOT PAIN       COMPARISONS None available.       FINDINGS 3 weight bearing views of the left foot were obtained. There   is a bunion with severe osteoarthritis involving the first MTP joint   and hallux valgus deformity of the left great toe. There is mild   osteoarthritis involving the interphalangeal joints of the toes of the   left foot. There is a small heel spur. There is no fracture or   dislocation involving left foot.       IMPRESSION    1. POSITIVE FOR A BUNION AND MILD OSTEOARTHRITIS INVOLVING THE TOES OF   THE LEFT FOOT.       2. NO ACUTE FRACTURE OR DISLOCATION INVOLVING LEFT FOOT. Imaging 9/30/2015  EXAMINATION RIGHT FOOT, 3 VIEWS       CLINICAL HISTORY RIGHT FOOT PAIN       COMPARISONS NONE AVAILABLE.       FINDINGS 3 weight bearing views of the right foot were obtained. There   is a bunion with severe osteoarthritis involving the first MTP joint   and hallux valgus deformity of the right great toe. There is mild   osteoarthritis involving the interphalangeal joints of the toes of the   right foot. There is no acute fracture or dislocation involving the   right foot. There are no bone erosions or bone lesions involving the   right foot.       IMPRESSION    1. POSITIVE FOR A BUNION WITH SEVERE OSTEOARTHRITIS IN THE FIRST MTP   JOINT AND HALLUX VALGUS DEFORMITY OF THE RIGHT GREAT TOE. ALSO, MILD   OSTEOARTHRITIS IN THE TOES OF THE RIGHT FOOT       2. NO ACUTE FRACTURE OR DISLOCATION INVOLVING THE RIGHT FOOT.          -  RADWHERE        Read By- Marcel Doss   Released By- Marcel Doss   Released Date Time- 09/30/15 1646    This document has been electronically signed. A/P: Gary Downing 80 y.o. female presenting for     1.  Chronic pain of left knee  History and clinical

## 2018-08-09 NOTE — TELEPHONE ENCOUNTER
Per Dr. Berta Quigley called patient to inform her of her xray results of left knee. Could not reach patient or patients daughter so I left a voicemail to let her know it did not show a fracture just fluid on the knee and Dr. Berta Quigley would like her to come in her an appt to have it drained. Possibly tomorrow if it fits patients schedule.

## 2018-08-10 ENCOUNTER — OFFICE VISIT (OUTPATIENT)
Dept: INTERNAL MEDICINE CLINIC | Age: 83
End: 2018-08-10
Payer: COMMERCIAL

## 2018-08-10 VITALS
SYSTOLIC BLOOD PRESSURE: 157 MMHG | WEIGHT: 157 LBS | OXYGEN SATURATION: 93 % | HEART RATE: 71 BPM | BODY MASS INDEX: 30.66 KG/M2 | RESPIRATION RATE: 15 BRPM | DIASTOLIC BLOOD PRESSURE: 68 MMHG

## 2018-08-10 DIAGNOSIS — M17.12 PRIMARY OSTEOARTHRITIS OF LEFT KNEE: Primary | ICD-10-CM

## 2018-08-10 DIAGNOSIS — M25.462 EFFUSION OF LEFT KNEE JOINT: ICD-10-CM

## 2018-08-10 PROCEDURE — 20610 DRAIN/INJ JOINT/BURSA W/O US: CPT | Performed by: FAMILY MEDICINE

## 2018-08-10 NOTE — PROGRESS NOTES
Chief Complaint   Patient presents with    Knee Pain     left knee sweeling        HPI: Janeth Thompson 80 y.o. female presenting for     Here for joint aspiration of the left knee. Seizure note  After consent was obtained, using sterile technique the left knee was prepped. Area was cleaned with alcohol and then cleaned three times with beta iodine. First attempt was unsuccessful. Second attempt was only able to draw out 1 cc of fluid. The knee joint was entered and 1 ml's of serosanguinous colored with a slight blood tinge fluid was withdrawn from knee. Patient tolerated the procedure. The patient is asked to continue to rest the knee for a few more days before resuming regular activities. It may be more painful for the first 1-2 days. Watch for fever, or increased swelling or persistent pain in knee. Call or return to clinic prn if such symptoms occur or the knee fails to improve as anticipated. Current Outpatient Prescriptions   Medication Sig Dispense Refill    ezetimibe (ZETIA) 10 MG tablet Take 10 mg by mouth daily      metFORMIN (GLUCOPHAGE) 500 MG tablet Take 1 tablet by mouth 2 times daily (with meals) 180 tablet 1    lidocaine (LIDODERM) 5 % Place 1 patch onto the skin daily 12 hours on, 12 hours off.  30 patch 0    famotidine (PEPCID) 40 MG tablet Take 1 tablet by mouth nightly as needed (heartburn) 90 tablet 1    amLODIPine (NORVASC) 5 MG tablet take 1 tablet by mouth once daily 90 tablet 1    levothyroxine (SYNTHROID) 112 MCG tablet take 1 tablet by mouth every morning BEFORE A MEAL 90 tablet 1    furosemide (LASIX) 20 MG tablet take 1 tablet by mouth once daily 90 tablet 1    cloNIDine (CATAPRES) 0.3 MG tablet take 1 tablet by mouth twice a day 180 tablet 1    pravastatin (PRAVACHOL) 40 MG tablet Take 1 tablet by mouth once daily 90 tablet 1    lisinopril (PRINIVIL;ZESTRIL) 20 MG tablet Take 1 tablet by mouth 2 times daily 180 tablet 1    umeclidinium-vilanterol (ANORO DO Ofelia    2. Effusion of left knee joint  According to x-ray patient has moderate to large effusion of the left knee. Tried to withdraw fluid from the knee but was only able to get out 1 mL. Will refer patient to physical medicine rehabilitation for possible ultrasound-guided aspiration of fluid. Patient explained procedure risk and benefits and the possibility of not removing fluid. Patient is agreeable to the plan. Patient also told to watch out for any fever, rash, bleeding, worsening in pain.   Patient told to go to the emergency department if these things occur.  - [id number] Abdirizak Hunter DO

## 2018-08-27 LAB
ANION GAP SERPL CALCULATED.3IONS-SCNC: 13 MEQ/L (ref 7–13)
BUN BLDV-MCNC: 15 MG/DL (ref 8–23)
CALCIUM SERPL-MCNC: 9.3 MG/DL (ref 8.6–10.2)
CHLORIDE BLD-SCNC: 105 MEQ/L (ref 98–107)
CO2: 24 MEQ/L (ref 22–29)
CREAT SERPL-MCNC: 0.77 MG/DL (ref 0.5–0.9)
GFR AFRICAN AMERICAN: >60
GFR NON-AFRICAN AMERICAN: >60
GLUCOSE BLD-MCNC: 140 MG/DL (ref 74–109)
POTASSIUM SERPL-SCNC: 4.9 MEQ/L (ref 3.5–5.1)
SODIUM BLD-SCNC: 142 MEQ/L (ref 132–144)

## 2018-09-14 LAB
ANION GAP SERPL CALCULATED.3IONS-SCNC: 13 MEQ/L (ref 7–13)
BUN BLDV-MCNC: 21 MG/DL (ref 8–23)
CHLORIDE BLD-SCNC: 106 MEQ/L (ref 98–107)
CO2: 23 MEQ/L (ref 22–29)
CREAT SERPL-MCNC: 0.96 MG/DL (ref 0.5–0.9)
GFR AFRICAN AMERICAN: >60
GFR NON-AFRICAN AMERICAN: 55.4
POTASSIUM SERPL-SCNC: 4.7 MEQ/L (ref 3.5–5.1)
SODIUM BLD-SCNC: 142 MEQ/L (ref 132–144)

## 2018-10-16 ENCOUNTER — OFFICE VISIT (OUTPATIENT)
Dept: INTERNAL MEDICINE CLINIC | Age: 83
End: 2018-10-16
Payer: COMMERCIAL

## 2018-10-16 VITALS
HEIGHT: 60 IN | DIASTOLIC BLOOD PRESSURE: 70 MMHG | OXYGEN SATURATION: 96 % | HEART RATE: 65 BPM | TEMPERATURE: 97.8 F | BODY MASS INDEX: 31.02 KG/M2 | SYSTOLIC BLOOD PRESSURE: 142 MMHG | WEIGHT: 158 LBS

## 2018-10-16 DIAGNOSIS — E11.21 CONTROLLED TYPE 2 DIABETES MELLITUS WITH PROTEINURIC DIABETIC NEPHROPATHY (HCC): Primary | ICD-10-CM

## 2018-10-16 DIAGNOSIS — I10 ESSENTIAL HYPERTENSION: ICD-10-CM

## 2018-10-16 DIAGNOSIS — G89.29 CHRONIC PAIN OF LEFT KNEE: ICD-10-CM

## 2018-10-16 DIAGNOSIS — M25.562 CHRONIC PAIN OF LEFT KNEE: ICD-10-CM

## 2018-10-16 LAB
GLUCOSE BLD-MCNC: 189 MG/DL
HBA1C MFR BLD: 7.1 %

## 2018-10-16 PROCEDURE — 1090F PRES/ABSN URINE INCON ASSESS: CPT | Performed by: INTERNAL MEDICINE

## 2018-10-16 PROCEDURE — 4040F PNEUMOC VAC/ADMIN/RCVD: CPT | Performed by: INTERNAL MEDICINE

## 2018-10-16 PROCEDURE — G8427 DOCREV CUR MEDS BY ELIG CLIN: HCPCS | Performed by: INTERNAL MEDICINE

## 2018-10-16 PROCEDURE — 1123F ACP DISCUSS/DSCN MKR DOCD: CPT | Performed by: INTERNAL MEDICINE

## 2018-10-16 PROCEDURE — 83036 HEMOGLOBIN GLYCOSYLATED A1C: CPT | Performed by: INTERNAL MEDICINE

## 2018-10-16 PROCEDURE — 1101F PT FALLS ASSESS-DOCD LE1/YR: CPT | Performed by: INTERNAL MEDICINE

## 2018-10-16 PROCEDURE — G8484 FLU IMMUNIZE NO ADMIN: HCPCS | Performed by: INTERNAL MEDICINE

## 2018-10-16 PROCEDURE — 1036F TOBACCO NON-USER: CPT | Performed by: INTERNAL MEDICINE

## 2018-10-16 PROCEDURE — G8417 CALC BMI ABV UP PARAM F/U: HCPCS | Performed by: INTERNAL MEDICINE

## 2018-10-16 PROCEDURE — 99214 OFFICE O/P EST MOD 30 MIN: CPT | Performed by: INTERNAL MEDICINE

## 2018-10-16 PROCEDURE — G8598 ASA/ANTIPLAT THER USED: HCPCS | Performed by: INTERNAL MEDICINE

## 2018-10-16 PROCEDURE — 82962 GLUCOSE BLOOD TEST: CPT | Performed by: INTERNAL MEDICINE

## 2018-10-16 PROCEDURE — G8399 PT W/DXA RESULTS DOCUMENT: HCPCS | Performed by: INTERNAL MEDICINE

## 2018-10-16 RX ORDER — FUROSEMIDE 20 MG/1
TABLET ORAL
Qty: 90 TABLET | Refills: 1 | Status: SHIPPED | OUTPATIENT
Start: 2018-10-16 | End: 2019-04-15 | Stop reason: SDUPTHER

## 2018-10-16 RX ORDER — LISINOPRIL 20 MG/1
20 TABLET ORAL 2 TIMES DAILY
Qty: 180 TABLET | Refills: 1 | Status: SHIPPED | OUTPATIENT
Start: 2018-10-16 | End: 2019-01-23 | Stop reason: SDUPTHER

## 2018-10-16 RX ORDER — ACETAMINOPHEN AND CODEINE PHOSPHATE 300; 30 MG/1; MG/1
1 TABLET ORAL 4 TIMES DAILY PRN
Qty: 12 TABLET | Refills: 0 | Status: SHIPPED | OUTPATIENT
Start: 2018-10-16 | End: 2018-10-19

## 2018-10-16 RX ORDER — CLONIDINE HYDROCHLORIDE 0.3 MG/1
TABLET ORAL
Qty: 180 TABLET | Refills: 1 | Status: SHIPPED | OUTPATIENT
Start: 2018-10-16 | End: 2019-01-23 | Stop reason: SDUPTHER

## 2018-10-16 RX ORDER — LEVOTHYROXINE SODIUM 112 UG/1
TABLET ORAL
Qty: 90 TABLET | Refills: 1 | Status: SHIPPED | OUTPATIENT
Start: 2018-10-16 | End: 2019-04-13 | Stop reason: SDUPTHER

## 2018-10-16 RX ORDER — PRAVASTATIN SODIUM 40 MG
TABLET ORAL
Qty: 90 TABLET | Refills: 1 | Status: SHIPPED | OUTPATIENT
Start: 2018-10-16 | End: 2019-01-23 | Stop reason: SDUPTHER

## 2018-10-16 RX ORDER — FAMOTIDINE 40 MG/1
40 TABLET, FILM COATED ORAL NIGHTLY PRN
Qty: 90 TABLET | Refills: 1 | Status: SHIPPED | OUTPATIENT
Start: 2018-10-16 | End: 2020-07-02 | Stop reason: SDUPTHER

## 2018-10-16 ASSESSMENT — ENCOUNTER SYMPTOMS
COUGH: 0
ORTHOPNEA: 0
SHORTNESS OF BREATH: 0
ANAL BLEEDING: 0
WHEEZING: 0
TROUBLE SWALLOWING: 0
BACK PAIN: 1
CHEST TIGHTNESS: 0
CHOKING: 0
ABDOMINAL PAIN: 0
EYE PAIN: 0

## 2018-10-16 NOTE — PROGRESS NOTES
0 - 200 mg/dL Final    ATP III Triglycerides Classification is Normal.    HDL 07/19/2018 50  40 - 59 mg/dL Final    Comment: ATP III HDL Cholesterol Classification is Desirable. Expected Values:    Males:    >55 = No Risk            35-55 = Moderate Risk            <35 = High Risk    Females:  >65 = No Risk            45-65 = Moderate Risk            <45 = High Risk    NCEP Guidelines: Third Report May 2001  >59 = negative risk factor for CHD  <40 = major risk factor for CHD      LDL Calculated 07/19/2018 89  0 - 129 mg/dL Final    ATP III LDL Classification is Optimal.   Orders Only on 07/19/2018   Component Date Value Ref Range Status    Total CK 07/19/2018 51  0 - 170 U/L Final   Orders Only on 07/19/2018   Component Date Value Ref Range Status    BUN 07/19/2018 21  8 - 23 mg/dL Final    CREATININE 07/19/2018 1.10* 0.50 - 0.90 mg/dL Final    GFR Non- 07/19/2018 47.4* >60 Final    Comment: >60 mL/min/1.73m2 EGFR, calc. for ages 25 and older using the  MDRD formula (not corrected for weight), is valid for stable  renal function.  GFR  07/19/2018 57.3* >60 Final    Comment: >60 mL/min/1.73m2 EGFR, calc. for ages 25 and older using the  MDRD formula (not corrected for weight), is valid for stable  renal function.  Sodium 07/19/2018 141  132 - 144 mEq/L Final    Potassium 07/19/2018 5.2* 3.5 - 5.1 mEq/L Final    Chloride 07/19/2018 104  98 - 107 mEq/L Final    CO2 07/19/2018 25  22 - 29 mEq/L Final    Anion Gap 07/19/2018 12  7 - 13 mEq/L Final       HPI:    Diabetes   She presents for her follow-up diabetic visit. She has type 2 diabetes mellitus. Her disease course has been stable. Hypoglycemia symptoms include nervousness/anxiousness. Pertinent negatives for hypoglycemia include no confusion, dizziness, headaches or tremors. Associated symptoms include fatigue (exertional).  Pertinent negatives for diabetes include no chest pain, no foot paresthesias, no foot ulcerations, no polydipsia, no polyuria and no weakness. There are no hypoglycemic complications. Symptoms are stable. Diabetic complications include heart disease and nephropathy. Pertinent negatives for diabetic complications include no CVA. Risk factors for coronary artery disease include post-menopausal, sedentary lifestyle, hypertension, diabetes mellitus and obesity. Current diabetic treatment includes oral agent (monotherapy). She is compliant with treatment most of the time. Her weight is fluctuating minimally. She is following a generally healthy diet. When asked about meal planning, she reported none. She has not had a previous visit with a dietitian. She never participates in exercise. Her home blood glucose trend is fluctuating minimally. Her breakfast blood glucose range is generally 130-140 mg/dl. Her overall blood glucose range is 140-180 mg/dl. An ACE inhibitor/angiotensin II receptor blocker is being taken. She sees a podiatrist.Eye exam is not current. Hypertension   This is a chronic problem. The current episode started more than 1 year ago. The problem is resistant. Associated symptoms include anxiety. Pertinent negatives include no chest pain, headaches, malaise/fatigue, neck pain, orthopnea, palpitations, PND or shortness of breath. There are no associated agents to hypertension. Risk factors for coronary artery disease include diabetes mellitus, post-menopausal state and sedentary lifestyle. Past treatments include ACE inhibitors, calcium channel blockers and diuretics. The current treatment provides moderate improvement. Compliance problems include diet and exercise. Hypertensive end-organ damage includes CAD/MI. There is no history of angina, CVA or heart failure. There is no history of chronic renal disease, renovascular disease or sleep apnea. Knee Pain    The incident occurred more than 1 week ago. There was no injury mechanism. The pain is present in the left knee.  The pain is severe. The pain has been improving since onset. Pertinent negatives include no inability to bear weight, muscle weakness or numbness. The symptoms are aggravated by movement, palpation and weight bearing. The treatment provided significant relief. More detail above in the chiefcomplaint(s), interim history and below in the review of systems.      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     Diabetes mellitus type II     Emphysema lung ([address]     H/O colonoscopy 10/02/2012    dr David Gaffney H/O mammogram ,     HTN (hypertension)     Learning disability     on SSI    Osteopenia 2015    S/P left mastectomy 1985    cancer, no rx    Senile osteopenia     Tubular adenoma of colon        Past Surgical History:   Procedure Laterality Date     SECTION  x 3    MASTECTOMY, RADICAL Left     breast cancer       Social History     Social History    Marital status:      Spouse name: N/A    Number of children: 3    Years of education: N/A     Occupational History    homemaker, SSI for learning disability      Social History Main Topics    Smoking status: Former Smoker     Quit date: 4/10/2007    Smokeless tobacco: Never Used    Alcohol use No    Drug use: No    Sexual activity: Not on file     Other Topics Concern    Not on file     Social History Narrative    Born in [facility], older of 3 children, came to the [address] [place] [address],[address] at age 23, sister and brother stayed in [facility]    , homemaker, on SSI for learning disability    3 daughters, several grandchildren    Has large family, plenty of support    Plans to travel to [facility] to see siblings (, after 61 years)       Family History   Problem Relation Age of Onset   Ulisses To Diabetes Mother         dec age 80       Family and socialhistory were reviewed and pertinent changes documented. ALLERGIES    Patient has no known allergies. Current Outpatient Prescriptions on File Prior to Visit   Medication Sig Dispense Refill    ezetimibe (ZETIA) 10 MG tablet Take 10 mg by mouth daily      metFORMIN (GLUCOPHAGE) 500 MG tablet Take 1 tablet by mouth 2 times daily (with meals) 180 tablet 1    lidocaine (LIDODERM) 5 % Place 1 patch onto the skin daily 12 hours on, 12 hours off. 30 patch 0    amLODIPine (NORVASC) 5 MG tablet take 1 tablet by mouth once daily 90 tablet 1    umeclidinium-vilanterol (ANORO ELLIPTA) 62.5-25 MCG/INH AEPB inhaler Inhale 1 puff into the lungs daily      isosorbide mononitrate (IMDUR) 120 MG extended release tablet Take 1 tablet by mouth daily 90 tablet 1    aspirin (RA ASPIRIN ADULT LOW STRENGTH) 81 MG EC tablet take 2 tablets by mouth once daily 180 tablet 2    Cyanocobalamin (VITAMIN B-12 PO) Take  by mouth daily.  Omega-3 Fatty Acids (OMEGA 3 PO) Take 120 mg by mouth daily.  fluticasone (FLONASE) 50 MCG/ACT nasal spray 2 sprays by Nasal route daily.  nitroGLYCERIN (NITROQUICK) 0.4 MG SL tablet Place 1 tablet under the tongue every 5 minutes as needed. 25 tablet 6    Calcium Carb-Cholecalciferol (OYSTER SHELL CALCIUM + D PO) Take 1 tablet by mouth daily.  ONE TOUCH ULTRASOFT LANCETS Test once daily and as directed       vitamin D (CHOLECALCIFEROL) 1000 UNIT TABS tablet Take 1,000 Int'l Units by mouth daily. No current facility-administered medications on file prior to visit. Review of Systems   Constitutional: Positive for fatigue (exertional). Negative for activity change, appetite change, malaise/fatigue and unexpected weight change. HENT: Negative for nosebleeds and trouble swallowing. Eyes: Negative for pain. Respiratory: Negative for cough, choking, chest tightness, shortness of breath and wheezing. Cardiovascular: Negative for chest pain, palpitations, orthopnea, leg swelling and PND.

## 2018-10-31 ENCOUNTER — OFFICE VISIT (OUTPATIENT)
Dept: PHYSICAL MEDICINE AND REHAB | Age: 83
End: 2018-10-31
Payer: COMMERCIAL

## 2018-10-31 VITALS
SYSTOLIC BLOOD PRESSURE: 140 MMHG | HEIGHT: 61 IN | BODY MASS INDEX: 28.89 KG/M2 | DIASTOLIC BLOOD PRESSURE: 70 MMHG | WEIGHT: 153 LBS

## 2018-10-31 DIAGNOSIS — M25.562 CHRONIC PAIN OF LEFT KNEE: Primary | ICD-10-CM

## 2018-10-31 DIAGNOSIS — Z79.899 HIGH RISK MEDICATION USE: ICD-10-CM

## 2018-10-31 DIAGNOSIS — M25.551 HIP PAIN, BILATERAL: ICD-10-CM

## 2018-10-31 DIAGNOSIS — M25.552 HIP PAIN, BILATERAL: ICD-10-CM

## 2018-10-31 DIAGNOSIS — M25.511 CHRONIC PAIN OF BOTH SHOULDERS: ICD-10-CM

## 2018-10-31 DIAGNOSIS — M25.512 CHRONIC PAIN OF BOTH SHOULDERS: ICD-10-CM

## 2018-10-31 DIAGNOSIS — M79.10 MYALGIA: ICD-10-CM

## 2018-10-31 DIAGNOSIS — M54.2 NECK PAIN: ICD-10-CM

## 2018-10-31 DIAGNOSIS — G89.29 CHRONIC PAIN OF LEFT KNEE: Primary | ICD-10-CM

## 2018-10-31 DIAGNOSIS — G89.29 CHRONIC PAIN OF BOTH SHOULDERS: ICD-10-CM

## 2018-10-31 DIAGNOSIS — M51.36 DDD (DEGENERATIVE DISC DISEASE), LUMBAR: ICD-10-CM

## 2018-10-31 DIAGNOSIS — M15.9 PRIMARY OSTEOARTHRITIS INVOLVING MULTIPLE JOINTS: ICD-10-CM

## 2018-10-31 LAB
AMPHETAMINE SCREEN, URINE: NORMAL
BARBITURATE SCREEN URINE: NORMAL
BENZODIAZEPINE SCREEN, URINE: NORMAL
CANNABINOID SCREEN URINE: NORMAL
COCAINE METABOLITE SCREEN URINE: NORMAL
Lab: NORMAL
OPIATE SCREEN URINE: NORMAL
PHENCYCLIDINE SCREEN URINE: NORMAL

## 2018-10-31 PROCEDURE — G8427 DOCREV CUR MEDS BY ELIG CLIN: HCPCS | Performed by: PHYSICAL MEDICINE & REHABILITATION

## 2018-10-31 PROCEDURE — G8598 ASA/ANTIPLAT THER USED: HCPCS | Performed by: PHYSICAL MEDICINE & REHABILITATION

## 2018-10-31 PROCEDURE — G8417 CALC BMI ABV UP PARAM F/U: HCPCS | Performed by: PHYSICAL MEDICINE & REHABILITATION

## 2018-10-31 PROCEDURE — 4040F PNEUMOC VAC/ADMIN/RCVD: CPT | Performed by: PHYSICAL MEDICINE & REHABILITATION

## 2018-10-31 PROCEDURE — 1101F PT FALLS ASSESS-DOCD LE1/YR: CPT | Performed by: PHYSICAL MEDICINE & REHABILITATION

## 2018-10-31 PROCEDURE — G8484 FLU IMMUNIZE NO ADMIN: HCPCS | Performed by: PHYSICAL MEDICINE & REHABILITATION

## 2018-10-31 PROCEDURE — G8399 PT W/DXA RESULTS DOCUMENT: HCPCS | Performed by: PHYSICAL MEDICINE & REHABILITATION

## 2018-10-31 PROCEDURE — 99205 OFFICE O/P NEW HI 60 MIN: CPT | Performed by: PHYSICAL MEDICINE & REHABILITATION

## 2018-10-31 PROCEDURE — 1090F PRES/ABSN URINE INCON ASSESS: CPT | Performed by: PHYSICAL MEDICINE & REHABILITATION

## 2018-10-31 PROCEDURE — 1036F TOBACCO NON-USER: CPT | Performed by: PHYSICAL MEDICINE & REHABILITATION

## 2018-10-31 PROCEDURE — 1123F ACP DISCUSS/DSCN MKR DOCD: CPT | Performed by: PHYSICAL MEDICINE & REHABILITATION

## 2018-10-31 ASSESSMENT — ENCOUNTER SYMPTOMS
ORTHOPNEA: 0
COUGH: 1
APNEA: 1
STRIDOR: 1
VOICE CHANGE: 1
PHOTOPHOBIA: 0
SHORTNESS OF BREATH: 1
CONSTIPATION: 0
SINUS PAIN: 0
NAUSEA: 0
CHOKING: 1
BACK PAIN: 1
RHINORRHEA: 0
DIARRHEA: 0
SINUS PRESSURE: 1
TROUBLE SWALLOWING: 1

## 2018-10-31 NOTE — PROGRESS NOTES
Subjective  Lonza Alexandra, 80 y.o. female presents today with:       Establish Care Referred by Dr Barry Nowak. [facility] for Osteoarthritis of left knee. Effusion of left knee. Knee Pain bilat left is worse Patient's knee locked up 3 weeks ago. Could not bend knee for 2-3 days. Back Pain     Treatment Patient has had fluid remoed from left knee 3 weeks ago. Aleve and Tylenol#3. Knee Pain    The incident occurred more than 1 week ago. There was no injury mechanism. The pain is present in the left knee. The pain is at a severity of 8/10. The pain is severe. The pain has been worsening since onset. Associated symptoms include numbness. Pertinent negatives include no inability to bear weight, muscle weakness or tingling. She reports no foreign bodies present. The symptoms are aggravated by movement, palpation and weight bearing. She has tried acetaminophen, heat, elevation, ice, immobilization, NSAIDs and rest for the symptoms. The treatment provided mild relief. Back Pain   This is a chronic problem. The current episode started more than 1 year ago. The problem occurs constantly. The problem has been gradually worsening since onset. The pain is present in the lumbar spine, sacro-iliac and thoracic spine. The quality of the pain is described as aching. The pain is at a severity of 8/10. The pain is severe. The pain is worse during the day. The symptoms are aggravated by bending. Stiffness is present in the morning. Associated symptoms include chest pain, leg pain and numbness. Pertinent negatives include no fever, headaches, paresis, tingling or weakness. Risk factors include lack of exercise, menopause, obesity, history of osteoporosis and history of steroid use. She has tried analgesics, NSAIDs, walking and heat for the symptoms. The treatment provided mild relief. Neck Pain    This is a chronic problem. The current episode started more than 1 year ago. The problem occurs constantly.  The problem OSTEOARTHRITIS IN THE LEFT KNEE.  MARKED OSTEOARTHRITIS IN THE   MEDIAL COMPARTMENT OF THE RIGHT KNEE.   ,     I discussed results with patients. see Follow up plans below  For any new studies. Care Everywhere Updates:  requested and reviewed. No new issues noted. Electrodiagnostic:  Previous studies requested,     I discussed results with patient. See follow-up plans for new studies. Labs:  Previous labs reviewed     Lab Results   Component Value Date     09/14/2018    K 4.7 09/14/2018     09/14/2018    CO2 23 09/14/2018    BUN 21 09/14/2018    CREATININE 0.96 09/14/2018    CALCIUM 9.3 08/27/2018    LABALBU 4.3 01/15/2015    BILITOT 0.2 01/15/2015    ALKPHOS 65 01/15/2015    AST 16 01/15/2015    ALT 12 01/15/2015     Lab Results   Component Value Date    WBC 6.7 01/15/2015    RBC 4.59 01/15/2015    HGB 14.2 01/15/2015    HCT 42.8 01/15/2015    MCV 93.2 01/15/2015    MCH 30.9 01/15/2015    MCHC 33.2 01/15/2015    RDW 13.2 01/15/2015     01/15/2015    MPV 10.0 01/15/2015       No results found for: LABAMPH, BARBSCNU, LABBENZ, CANSU, COCAIMETSCRU, PHENCYCLIDINESCREENURINE, TRICYCLIC, DSCOMMENT    No results found for: CODEINE, MORPHINE, ACETYLMORPHI, OXYCODONE, NOROXYCODONE, NOROXYMU, HYDRCO, NORHYDU, HYDROMO, BUPREN, NORBUPRNOR, FENTA, NORFENT, MEPERIDINE, TAPENU, TAPOSULFUR, METHADONE, LABPROP, TRAM, AMPH, METHAMP, MDMA, ECMDA    Lab Results   Component Value Date    LABCREA 68.1 04/21/2015         , I discussed results with patient. See follow-up plans for new studies. Therapies:  HEP-gentle stretching and relaxation techniques-demonstrated with patient-they are to do them twice a day.   They are also advised to make the following lifestyle changes:  Goals      Activity Plan            I will increase my activity by Advised to engage in walking daily, 30 minutes at one time  Barriers: none  Plan for overcoming my barriers: N/A  Confidence: 8/10  Anticipated Goal Completion Date: 3 months        Blood Pressure < 140/90      Blood Pressure < 140/90      Exercise 3x per week (30 min per time)      Exercise 3x per week (30 min per time)      Exercise 3x per week (30 min per time)      Exercise 5 x per week (30 min per time)      HDL > 40      HDL > 40      HEMOGLOBIN A1C < 7.0      HEMOGLOBIN A1C < 7.0      HEMOGLOBIN A1C < 7.0      LDL CALC < 130      LDL CALC < 130      LDL CALC < 130      Nutrition Plan            I will follow a nutritional plan as directed   Calorie Controlled:   1600 ADA diet, per handout obtained today in the office    Barriers: none  Plan for overcoming my barriers: N/A  Confidence: 7/10  Anticipated Goal Completion Date: 6 months       Reduce calorie intake to 1600 calories per day      Reduce calorie intake to 1800 calories per day      Weight (lb) < 150      Wellness Goal            Patient Self-Management Goal for Health Maintenance  Goal: I will schedule a yearly preventative care visit. Barriers: none  Plan for overcoming my barriers: N/A  Confidence: 8/10  Anticipated Goal Completion Date: 6 months            Injections or Epidurals:  Injection options were discussed. B knee injections    After a complete review of the medical record,OARRS, a comprehensive physical exam, and discussion with the patient I have determined that the patient would benefit from a series of 2 caudal epidural steroid injections using a C-arm and contrast as appropriate. The risks and benefits were thoroughly explained to the patient orally and in writing. Pt gave verbal and writen consent. The patient was given the option of taking PO Valium 5-10 mg prior to the procedure. They were told that if they chose to take the Valium they should not drive while under it's affects. The patient is to call us as needed and the day following each procedure to report any concerns or problems.   They will follow up with me approximately one month after

## 2018-11-06 ENCOUNTER — PROCEDURE VISIT (OUTPATIENT)
Dept: PHYSICAL MEDICINE AND REHAB | Age: 83
End: 2018-11-06
Payer: COMMERCIAL

## 2018-11-06 DIAGNOSIS — M25.561 CHRONIC PAIN OF RIGHT KNEE: Primary | ICD-10-CM

## 2018-11-06 DIAGNOSIS — G89.29 CHRONIC PAIN OF RIGHT KNEE: Primary | ICD-10-CM

## 2018-11-06 PROCEDURE — 20610 DRAIN/INJ JOINT/BURSA W/O US: CPT | Performed by: PHYSICAL MEDICINE & REHABILITATION

## 2018-11-06 RX ORDER — LIDOCAINE HYDROCHLORIDE 20 MG/ML
2 INJECTION, SOLUTION EPIDURAL; INFILTRATION; INTRACAUDAL; PERINEURAL ONCE
Status: COMPLETED | OUTPATIENT
Start: 2018-11-06 | End: 2018-11-06

## 2018-11-06 RX ADMIN — LIDOCAINE HYDROCHLORIDE 2 ML: 20 INJECTION, SOLUTION EPIDURAL; INFILTRATION; INTRACAUDAL; PERINEURAL at 11:34

## 2018-11-12 NOTE — PATIENT INSTRUCTIONS
Patient Education        Back Stretches: Exercises  Your Care Instructions  Here are some examples of exercises for stretching your back. Start each exercise slowly. Ease off the exercise if you start to have pain. Your doctor or physical therapist will tell you when you can start these exercises and which ones will work best for you. How to do the exercises  Overhead stretch    1. Stand comfortably with your feet shoulder-width apart. 2. Looking straight ahead, raise both arms over your head and reach toward the ceiling. Do not allow your head to tilt back. 3. Hold for 15 to 30 seconds, then lower your arms to your sides. 4. Repeat 2 to 4 times. Side stretch    1. Stand comfortably with your feet shoulder-width apart. 2. Raise one arm over your head, and then lean to the other side. 3. Slide your hand down your leg as you let the weight of your arm gently stretch your side muscles. Hold for 15 to 30 seconds. 4. Repeat 2 to 4 times on each side. Press-up    1. Lie on your stomach, supporting your body with your forearms. 2. Press your elbows down into the floor to raise your upper back. As you do this, relax your stomach muscles and allow your back to arch without using your back muscles. As your press up, do not let your hips or pelvis come off the floor. 3. Hold for 15 to 30 seconds, then relax. 4. Repeat 2 to 4 times. Relax and rest    1. Lie on your back with a rolled towel under your neck and a pillow under your knees. Extend your arms comfortably to your sides. 2. Relax and breathe normally. 3. Remain in this position for about 10 minutes. 4. If you can, do this 2 or 3 times each day. Follow-up care is a key part of your treatment and safety. Be sure to make and go to all appointments, and call your doctor if you are having problems. It's also a good idea to know your test results and keep a list of the medicines you take. Where can you learn more?   Go to down behind your back. 7. With your other hand, apply gentle pressure to the bent elbow. [place] People feel a stretch at the back of your upper arm and shoulder. Hold about 6 seconds. 8. Repeat 2 to 4 times with each arm. Shoulder stretch    5. Relax your shoulders. 6. Raise one arm to shoulder height, and reach it across your chest.  7. Pull the arm slightly toward you with your other arm. This will help you get a gentle stretch. Hold for about 6 seconds. 8. Repeat 2 to 4 times. Shoulder blade squeeze    1. Sit or stand up tall with your arms at your sides. 2. Keep your shoulders relaxed and down, not shrugged. 3. Squeeze your shoulder blades together. Hold for 6 seconds, then relax. 4. Repeat 8 to 12 times. Straight-arm shoulder blade squeeze    1. Sit or stand tall. Relax your shoulders. 2. With palms down, hold your elastic tubing or band straight out in front of you. 3. Start with slight tension in the tubing or band, with your hands about shoulder-width apart. 4. Slowly pull straight out to the sides, squeezing your shoulder blades together. Keep your arms straight and at shoulder height. Slowly release. 5. Repeat 8 to 12 times. Rowing    1. [place] your elastic tubing or band at about waist height. Take one end in each hand. 2. Sit or stand with your feet hip-width apart. 3. Hold your arms straight in front of you. Adjust your distance to create slight tension in the tubing or band. 4. Slightly tuck your chin. Relax your shoulders. 5. Without shrugging your shoulders, pull straight back. Your elbows will pass alongside your waist.  Pull-downs    1. [place] your elastic tubing or band in the top of a closed door. Take one end in each hand. 2. Either sit or stand, depending on what is more comfortable. If you feel unsteady, sit on a chair. 3. Start with your arms up and comfortably apart, elbows straight. There should be a slight tension in the tubing or band.   4. Slightly tuck your chin, and look straight ahead. 5. Keeping your back straight, slowly pull down and back, bending your elbows. 6. Stop where your hands are level with your chin, in a \"goalpost\" position. 7. Repeat 8 to 12 times. Chest T stretch    1. Lie on your back. Raise your knees so they are bent. Plant your feet on the floor, hip-width apart. 2. Tuck your chin, and relax your shoulders. 3. Reach your arms straight out to the sides. If you don't feel a mild stretch in your shoulders and across your chest, use a foam roll or a tightly rolled blanket under your spine, from your tailbone to your head. 4. Relax in this position for at least 15 to 30 seconds while you breathe normally. Repeat 2 to 4 times. 5. As you get used to this stretch, keep adding a little more time until you are able relax in this position for 2 or 3 minutes. When you can relax for at least 2 minutes, you only need to do the exercise 1 time per session. Chest goalpost stretch    1. Lie on your back. Raise your knees so they are bent. Plant your feet on the floor, hip-width apart. 2. Tuck your chin, and relax your shoulders. 3. Reach your arms straight out to the sides. 4. Bend your arms at the elbows, with your hands pointed toward the top of your head. Your arms should make an L on either side of your head. Your palms should be facing up. 5. If you don't feel a mild stretch in your shoulders and across your chest, use a foam roll or tightly rolled blanket under your spine, from your tailbone to your head. 6. Relax in this position for at least 15 to 30 seconds while you breathe normally. Repeat 2 to 4 times. 7. Each day you do this exercise, add a little more time until you can relax in this position for 2 or 3 minutes. When you can relax for at least 2 minutes, you only need to do the exercise 1 time per session. Follow-up care is a key part of your treatment and safety.  Be sure to make and go to all appointments, and call your doctor if you are having problems. It's also a good idea to know your test results and keep a list of the medicines you take. Where can you learn more? Go to https://chpepiceweb.[email or web address]. org and sign in to your Magic Le[employer] account. Enter [phone] in the [facility] box to learn more about \"Shoulder Blade: Exercises. \"     If you do not have an account, please click on the \"Sign Up Now\" link. Current as of: March 21, 2017  Content Version: 11.5  © 5709-8871 Healthwise, [employer]. Care instructions adapted under license by [facility] ([facility]). If you have questions about a medical condition or this instruction, always ask your healthcare professional. [address] any warranty or liability for your use of this information. Patient Education        Shoulder Stretches: Exercises  Your Care Instructions  Here are some examples of exercises for your shoulder. Start each exercise slowly. Ease off the exercise if you start to have pain. Your doctor or physical therapist will tell you when you can start these exercises and which ones will work best for you. How to do the exercises  Shoulder stretch    10.  a doorway and place one arm against the door frame. Your elbow should be a little higher than your shoulder. 11. Relax your shoulders as you lean forward, allowing your chest and shoulder muscles to stretch. You can also turn your body slightly away from your arm to stretch the muscles even more. 12. Hold for 15 to 30 seconds. 13. Repeat 2 to 4 times with each arm. Shoulder and chest stretch    9. Shoulder and chest stretch  10. While sitting, relax your upper body so you slump slightly in your chair. 11. As you breathe in, straighten your back and open your arms out to the sides. 12. Gently pull your shoulder blades back and downward. 13. Hold for 15 to 30 seconds as your breathe normally. 14. Repeat 2 to 4 times. Overhead stretch    9. Reach up over your head with both arms.   10. Hold for 15 to 30 seconds. 11. Repeat 2 to 4 times. Follow-up care is a key part of your treatment and safety. Be sure to make and go to all appointments, and call your doctor if you are having problems. It's also a good idea to know your test results and keep a list of the medicines you take. Where can you learn more? Go to https://chpepiceweb.[email or web address]. org and sign in to your [employer] account. Enter S254 in the [employer] box to learn more about \"Shoulder Stretches: Exercises. \"     If you do not have an account, please click on the \"Sign Up Now\" link. Current as of: March 21, 2017  Content Version: 11.5  © 1469-2845 Healthwise, [employer]. Care instructions adapted under license by [facility] ([facility]). If you have questions about a medical condition or this instruction, always ask your healthcare professional. [address] any warranty or liability for your use of this information. Patient Education        Stretching: Exercises  Your Care Instructions  Here are some examples of exercises for stretching. Start each exercise slowly. Ease off the exercise if you start to have pain. Your doctor or physical therapist will tell you when you can start these exercises and which ones will work best for you. How to do the exercises  Latissimus stretch    14. Stand with your back straight and your feet shoulder-width apart. You can do this stretch sitting down if you are not steady on your feet. 15. Hold your arms above your head, and hold one hand with the other. 16. Pull upward while leaning straight over toward your right side. Keep your lower body straight. You should feel the stretch along your left side. 17. Hold 15 to 30 seconds, and then switch sides. 18. Repeat 2 to 4 times for each side. Triceps stretch    15. Stand with your back straight and your feet shoulder-width apart. You can do this stretch sitting down if you are not steady on your feet.   12. Bring your left <<-----Click on this checkbox to enter Post-Op Dx safety. Be sure to make and go to all appointments, and call your doctor if you are having problems. It's also a good idea to know your test results and keep a list of the medicines you take. Where can you learn more? Go to https://deejay.boo-box. org and sign in to your [employer] account. Enter [id number] in the [employer] box to learn more about \"Stretching: Exercises. \"     If you do not have an account, please click on the \"Sign Up Now\" link. Current as of: March 13, 2017  Content Version: 11.5  © 3206-1308 [employer]. Care instructions adapted under license by [facility] ([facility]). If you have questions about a medical condition or this instruction, always ask your healthcare professional. [address] any warranty or liability for your use of this information. Patient Education        Groin Strain: Care Instructions  Your Care Instructions  A groin strain is an injury that happens when you tear or overstretch (pull) a groin muscle. The groin muscles are in the area on either side of the body in the folds where the belly joins the legs. You can strain a groin muscle during exercise, such as running, skating, kicking in soccer, or playing basketball. It can happen when you lift, push, or pull heavy objects. You might pull a groin muscle when you fall. The injury can range from a minor pull to a more serious tear of the muscle. You may feel pain and tenderness that's worse when you squeeze your legs together. You may also have pain when you raise the knee of the injured side. There may be swelling or bruising in the groin area or inner thigh. If you have a bad strain, you may walk with a limp while it heals. Rest and other home care can help the muscle heal. Healing can take up to 3 weeks or more. Your doctor may want to see you again in 2 to 3 weeks. Follow-up care is a key part of your treatment and safety.  Be sure to make and go to all appointments, and call your doctor if you are having problems. It's also a good idea to know your test results and keep a list of the medicines you take. How can you care for yourself at home? · Be safe with medicines. Read and follow all instructions on the label. ¨ If the doctor gave you a prescription medicine for pain, take it as prescribed. ¨ If you are not taking a prescription pain medicine, ask your doctor if you can take an over-the-counter medicine. · Rest and protect your injured or sore groin area for 1 to 2 weeks. Stop, change, or take a break from any activity that may be causing your pain or soreness. Do not do intense activities while you still have pain. · Put ice or a cold pack on your groin area for 10 to 20 minutes at a time. Try to do this every 1 to 2 hours for the next 3 days (when you are awake) or until the swelling goes down. Put a thin cloth between the ice and your skin. · After 2 or 3 days, if your swelling is gone, apply heat. Put a warm water bottle, a heating pad set on low, or a warm cloth on your groin area. Do not go to sleep with a heating pad on your skin. · If your doctor gave you crutches, make sure you use them as directed. · Wear snug shorts or underwear that support the injured area. When should you call for help? Call your doctor now or seek immediate medical care if:  ? · You have new or severe pain or swelling in the groin area. ? · Your groin or upper thigh is cool or pale or changes color. ? · You have tingling, weakness, or numbness in your groin or leg. ? · You cannot move your leg. ? · You cannot put weight on your leg. ? Watch closely for changes in your health, and be sure to contact your doctor if:  ? · You do not get better as expected. Where can you learn more? Go to https://Indiegogoelberteb.Aloompa. org and sign in to your Mtime account. Enter U347 in the [employer] box to learn more about \"Groin Strain: Care Instructions. \"     If you do

## 2018-11-19 ENCOUNTER — PROCEDURE VISIT (OUTPATIENT)
Dept: PHYSICAL MEDICINE AND REHAB | Age: 83
End: 2018-11-19
Payer: COMMERCIAL

## 2018-11-19 DIAGNOSIS — M25.562 CHRONIC PAIN OF LEFT KNEE: Primary | ICD-10-CM

## 2018-11-19 DIAGNOSIS — G89.29 CHRONIC PAIN OF LEFT KNEE: Primary | ICD-10-CM

## 2018-11-19 PROCEDURE — 20611 DRAIN/INJ JOINT/BURSA W/US: CPT | Performed by: PHYSICAL MEDICINE & REHABILITATION

## 2018-11-19 RX ORDER — LIDOCAINE HYDROCHLORIDE 20 MG/ML
2 INJECTION, SOLUTION EPIDURAL; INFILTRATION; INTRACAUDAL; PERINEURAL ONCE
Status: COMPLETED | OUTPATIENT
Start: 2018-11-19 | End: 2018-11-19

## 2018-11-19 RX ADMIN — LIDOCAINE HYDROCHLORIDE 2 ML: 20 INJECTION, SOLUTION EPIDURAL; INFILTRATION; INTRACAUDAL; PERINEURAL at 12:06

## 2018-12-10 ENCOUNTER — OFFICE VISIT (OUTPATIENT)
Dept: PHYSICAL MEDICINE AND REHAB | Age: 83
End: 2018-12-10
Payer: COMMERCIAL

## 2018-12-10 VITALS
BODY MASS INDEX: 28.89 KG/M2 | DIASTOLIC BLOOD PRESSURE: 82 MMHG | WEIGHT: 153 LBS | SYSTOLIC BLOOD PRESSURE: 140 MMHG | HEIGHT: 61 IN

## 2018-12-10 DIAGNOSIS — Z79.899 HIGH RISK MEDICATION USE: ICD-10-CM

## 2018-12-10 DIAGNOSIS — M25.552 HIP PAIN, BILATERAL: ICD-10-CM

## 2018-12-10 DIAGNOSIS — M51.36 DDD (DEGENERATIVE DISC DISEASE), LUMBAR: ICD-10-CM

## 2018-12-10 DIAGNOSIS — M25.512 CHRONIC PAIN OF BOTH SHOULDERS: ICD-10-CM

## 2018-12-10 DIAGNOSIS — M54.2 NECK PAIN: ICD-10-CM

## 2018-12-10 DIAGNOSIS — M25.511 CHRONIC PAIN OF BOTH SHOULDERS: ICD-10-CM

## 2018-12-10 DIAGNOSIS — M25.551 HIP PAIN, BILATERAL: ICD-10-CM

## 2018-12-10 DIAGNOSIS — M85.80 SENILE OSTEOPENIA: Primary | ICD-10-CM

## 2018-12-10 DIAGNOSIS — M79.10 MYALGIA: ICD-10-CM

## 2018-12-10 DIAGNOSIS — G89.29 CHRONIC PAIN OF BOTH SHOULDERS: ICD-10-CM

## 2018-12-10 DIAGNOSIS — M15.9 PRIMARY OSTEOARTHRITIS INVOLVING MULTIPLE JOINTS: ICD-10-CM

## 2018-12-10 LAB
ANION GAP SERPL CALCULATED.3IONS-SCNC: 11 MEQ/L (ref 7–13)
BUN BLDV-MCNC: 15 MG/DL (ref 8–23)
CHLORIDE BLD-SCNC: 104 MEQ/L (ref 98–107)
CO2: 28 MEQ/L (ref 22–29)
CREAT SERPL-MCNC: 1.07 MG/DL (ref 0.5–0.9)
GFR AFRICAN AMERICAN: 59.1
GFR NON-AFRICAN AMERICAN: 48.8
POTASSIUM SERPL-SCNC: 4.3 MEQ/L (ref 3.5–5.1)
SODIUM BLD-SCNC: 143 MEQ/L (ref 132–144)

## 2018-12-10 PROCEDURE — G8427 DOCREV CUR MEDS BY ELIG CLIN: HCPCS | Performed by: PHYSICAL MEDICINE & REHABILITATION

## 2018-12-10 PROCEDURE — 1101F PT FALLS ASSESS-DOCD LE1/YR: CPT | Performed by: PHYSICAL MEDICINE & REHABILITATION

## 2018-12-10 PROCEDURE — G8417 CALC BMI ABV UP PARAM F/U: HCPCS | Performed by: PHYSICAL MEDICINE & REHABILITATION

## 2018-12-10 PROCEDURE — 4040F PNEUMOC VAC/ADMIN/RCVD: CPT | Performed by: PHYSICAL MEDICINE & REHABILITATION

## 2018-12-10 PROCEDURE — 1036F TOBACCO NON-USER: CPT | Performed by: PHYSICAL MEDICINE & REHABILITATION

## 2018-12-10 PROCEDURE — G8399 PT W/DXA RESULTS DOCUMENT: HCPCS | Performed by: PHYSICAL MEDICINE & REHABILITATION

## 2018-12-10 PROCEDURE — 1123F ACP DISCUSS/DSCN MKR DOCD: CPT | Performed by: PHYSICAL MEDICINE & REHABILITATION

## 2018-12-10 PROCEDURE — 99213 OFFICE O/P EST LOW 20 MIN: CPT | Performed by: PHYSICAL MEDICINE & REHABILITATION

## 2018-12-10 PROCEDURE — 1090F PRES/ABSN URINE INCON ASSESS: CPT | Performed by: PHYSICAL MEDICINE & REHABILITATION

## 2018-12-10 PROCEDURE — G8484 FLU IMMUNIZE NO ADMIN: HCPCS | Performed by: PHYSICAL MEDICINE & REHABILITATION

## 2018-12-10 PROCEDURE — G8598 ASA/ANTIPLAT THER USED: HCPCS | Performed by: PHYSICAL MEDICINE & REHABILITATION

## 2018-12-10 ASSESSMENT — ENCOUNTER SYMPTOMS
STRIDOR: 1
COUGH: 1
VOICE CHANGE: 1
NAUSEA: 0
DIARRHEA: 0
CONSTIPATION: 0
RHINORRHEA: 0
BACK PAIN: 1
SHORTNESS OF BREATH: 1
TROUBLE SWALLOWING: 1
APNEA: 1
SINUS PRESSURE: 1
PHOTOPHOBIA: 0
CHOKING: 1
SINUS PAIN: 0

## 2018-12-10 NOTE — PROGRESS NOTES
pain is present in the occipital region and midline. The pain is same all the time. Stiffness is present in the morning. Associated symptoms include chest pain, leg pain, numbness and trouble swallowing. Pertinent negatives include no fever, headaches, pain with swallowing, paresis, photophobia, syncope, tingling or weakness. She has tried heat and home exercises for the symptoms. The treatment provided mild relief.        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     Diabetes mellitus type II     Emphysema lung ([facility] [address]     H/O colonoscopy 10/02/2012    dr Bridgette Dominguez H/O mammogram ,     HTN (hypertension)     Learning disability     on SSI    Osteopenia     S/P left mastectomy 1985    cancer, no rx    Senile osteopenia     Tubular adenoma of colon      Past Surgical History:   Procedure Laterality Date     SECTION  x 3    MASTECTOMY, RADICAL Left     breast cancer     Social History     Social History    Marital status:      Spouse name: N/A    Number of children: 3    Years of education: N/A     Occupational History    homemaker, SSI for learning disability      Social History Main Topics    Smoking status: Former Smoker     Quit date: 4/10/2007    Smokeless tobacco: Never Used    Alcohol use No    Drug use: No    Sexual activity: Not Asked     Other Topics Concern    None     Social History Narrative    Born in [facility] [place], older of 3 children, came to the [address] [place] [address],[address] at age 23, sister and brother stayed in [facility] [facility] [place]    , homemaker, on SSI for learning disability    3 daughters, several grandchildren    Has large family, plenty of support    Plans to travel to [place] [facility] [place] to see siblings (, after 61 years)     Family History   Problem Relation Age of Onset    Diabetes Mother         dec age 80       No Known and mucous membranes are normal. No oral lesions. Normal dentition. No oropharyngeal exudate. No lesions   Eyes: Pupils are equal, round, and reactive to light. Conjunctivae and EOM are normal. Right eye exhibits no chemosis, no discharge and no exudate. Left eye exhibits no chemosis, no discharge and no exudate. No scleral icterus. Neck: Normal range of motion. Neck supple. No JVD present. No neck rigidity. No tracheal deviation and no edema present. No thyromegaly present. Cardiovascular: Regular rhythm, normal heart sounds and intact distal pulses. Exam reveals no gallop and no decreased pulses. Pulmonary/Chest: Effort normal. No accessory muscle usage. No apnea, no tachypnea and no bradypnea. No respiratory distress. She has decreased breath sounds. She has no wheezes. She has no rales. She exhibits no tenderness. Diminished breath sounds bilaterally     Abdominal: Soft. Bowel sounds are normal. She exhibits no distension and no mass. There is no tenderness. There is no rebound and no guarding. Musculoskeletal: She exhibits tenderness. She exhibits no edema. Right shoulder: Normal.        Left shoulder: Normal.        Right elbow: Normal.       Left elbow: Normal.        Right wrist: Normal.        Left wrist: Normal.        Right hip: Normal.        Left hip: Normal.        Right knee: She exhibits decreased range of motion and swelling. She exhibits no effusion. Tenderness found. Medial joint line tenderness noted. Left knee: She exhibits decreased range of motion and swelling. She exhibits no effusion. Tenderness found. Medial joint line and lateral joint line tenderness noted. Right ankle: Normal. Achilles tendon normal.        Left ankle: Normal. Achilles tendon normal.        Cervical back: She exhibits decreased range of motion, tenderness and bony tenderness. Thoracic back: She exhibits decreased range of motion, tenderness and bony tenderness.         Lumbar (lb) < 150      Wellness Goal            Patient Self-Management Goal for Health Maintenance  Goal: I will schedule a yearly preventative care visit. Barriers: none  Plan for overcoming my barriers: N/A  Confidence: 8/10  Anticipated Goal Completion Date: 6 months            Injections or Epidurals:  Injection options were discussed-TPs. Patient gave verbal consent to ordered injections. See follow-up plans for planned injections. Supplements:  Vitamin D with increased dosing during the winter months,   Education was given on:   Dietary and Fitness--daily stretches and low carb diet             Follow up with Primary Care Physician regarding their general medical needs. They are to follow up in 2 months to review medication, efficacy of injections, pill counts, OARRS check, SOAPPR assessment, review diagnostics, to review previous and future treatment plans and assess appropriateness for continued therapy.         New Diagnostics  Orders Placed This Encounter   Procedures    IL INJECT TRIGGER POINTS, 3 OR GREATER    IL INJECT TRIGGER POINT, 1 OR 2       Jyoti Gilbert DO

## 2019-01-23 ENCOUNTER — HOSPITAL ENCOUNTER (OUTPATIENT)
Dept: GENERAL RADIOLOGY | Age: 84
Discharge: HOME | End: 2019-01-25
Payer: COMMERCIAL

## 2019-01-23 ENCOUNTER — OFFICE VISIT (OUTPATIENT)
Dept: INTERNAL MEDICINE CLINIC | Age: 84
End: 2019-01-23
Payer: COMMERCIAL

## 2019-01-23 VITALS
WEIGHT: 156 LBS | SYSTOLIC BLOOD PRESSURE: 148 MMHG | OXYGEN SATURATION: 98 % | HEIGHT: 61 IN | DIASTOLIC BLOOD PRESSURE: 80 MMHG | HEART RATE: 77 BPM | BODY MASS INDEX: 29.45 KG/M2

## 2019-01-23 DIAGNOSIS — M25.512 ACUTE PAIN OF LEFT SHOULDER: ICD-10-CM

## 2019-01-23 DIAGNOSIS — E11.21 CONTROLLED TYPE 2 DIABETES MELLITUS WITH PROTEINURIC DIABETIC NEPHROPATHY (HCC): ICD-10-CM

## 2019-01-23 DIAGNOSIS — M25.512 ACUTE PAIN OF LEFT SHOULDER: Primary | ICD-10-CM

## 2019-01-23 PROCEDURE — G8399 PT W/DXA RESULTS DOCUMENT: HCPCS | Performed by: INTERNAL MEDICINE

## 2019-01-23 PROCEDURE — G8484 FLU IMMUNIZE NO ADMIN: HCPCS | Performed by: INTERNAL MEDICINE

## 2019-01-23 PROCEDURE — 1123F ACP DISCUSS/DSCN MKR DOCD: CPT | Performed by: INTERNAL MEDICINE

## 2019-01-23 PROCEDURE — 1101F PT FALLS ASSESS-DOCD LE1/YR: CPT | Performed by: INTERNAL MEDICINE

## 2019-01-23 PROCEDURE — 4040F PNEUMOC VAC/ADMIN/RCVD: CPT | Performed by: INTERNAL MEDICINE

## 2019-01-23 PROCEDURE — 99213 OFFICE O/P EST LOW 20 MIN: CPT | Performed by: INTERNAL MEDICINE

## 2019-01-23 PROCEDURE — G8598 ASA/ANTIPLAT THER USED: HCPCS | Performed by: INTERNAL MEDICINE

## 2019-01-23 PROCEDURE — 1090F PRES/ABSN URINE INCON ASSESS: CPT | Performed by: INTERNAL MEDICINE

## 2019-01-23 PROCEDURE — 73030 X-RAY EXAM OF SHOULDER: CPT

## 2019-01-23 PROCEDURE — 1036F TOBACCO NON-USER: CPT | Performed by: INTERNAL MEDICINE

## 2019-01-23 PROCEDURE — G8427 DOCREV CUR MEDS BY ELIG CLIN: HCPCS | Performed by: INTERNAL MEDICINE

## 2019-01-23 PROCEDURE — G8417 CALC BMI ABV UP PARAM F/U: HCPCS | Performed by: INTERNAL MEDICINE

## 2019-01-23 RX ORDER — ISOSORBIDE MONONITRATE 120 MG/1
120 TABLET, EXTENDED RELEASE ORAL DAILY
Qty: 90 TABLET | Refills: 1 | Status: SHIPPED | OUTPATIENT
Start: 2019-01-23 | End: 2019-07-18 | Stop reason: SDUPTHER

## 2019-01-23 RX ORDER — CLONIDINE HYDROCHLORIDE 0.3 MG/1
TABLET ORAL
Qty: 180 TABLET | Refills: 1 | Status: SHIPPED | OUTPATIENT
Start: 2019-01-23 | End: 2019-10-08 | Stop reason: SDUPTHER

## 2019-01-23 RX ORDER — LISINOPRIL 20 MG/1
20 TABLET ORAL 2 TIMES DAILY
Qty: 180 TABLET | Refills: 1 | Status: SHIPPED | OUTPATIENT
Start: 2019-01-23 | End: 2019-10-08 | Stop reason: SDUPTHER

## 2019-01-23 RX ORDER — AMLODIPINE BESYLATE 5 MG/1
TABLET ORAL
Qty: 90 TABLET | Refills: 1 | Status: SHIPPED | OUTPATIENT
Start: 2019-01-23 | End: 2019-12-17 | Stop reason: SDUPTHER

## 2019-01-23 RX ORDER — PRAVASTATIN SODIUM 40 MG
TABLET ORAL
Qty: 90 TABLET | Refills: 1 | Status: SHIPPED | OUTPATIENT
Start: 2019-01-23 | End: 2019-10-08 | Stop reason: SDUPTHER

## 2019-01-23 RX ORDER — ACETAMINOPHEN 325 MG/1
650 TABLET ORAL EVERY 6 HOURS PRN
Qty: 120 TABLET | Refills: 0 | Status: SHIPPED | OUTPATIENT
Start: 2019-01-23 | End: 2020-10-26 | Stop reason: DRUGHIGH

## 2019-01-23 ASSESSMENT — ENCOUNTER SYMPTOMS
EYE PAIN: 0
SHORTNESS OF BREATH: 0
TROUBLE SWALLOWING: 0
BACK PAIN: 1
ABDOMINAL PAIN: 0
CHEST TIGHTNESS: 0
ANAL BLEEDING: 0

## 2019-01-24 PROCEDURE — 96372 THER/PROPH/DIAG INJ SC/IM: CPT | Performed by: INTERNAL MEDICINE

## 2019-01-24 RX ORDER — METHYLPREDNISOLONE ACETATE 40 MG/ML
40 INJECTION, SUSPENSION INTRA-ARTICULAR; INTRALESIONAL; INTRAMUSCULAR; SOFT TISSUE ONCE
Status: COMPLETED | OUTPATIENT
Start: 2019-01-24 | End: 2019-01-24

## 2019-01-24 RX ADMIN — METHYLPREDNISOLONE ACETATE 40 MG: 40 INJECTION, SUSPENSION INTRA-ARTICULAR; INTRALESIONAL; INTRAMUSCULAR; SOFT TISSUE at 13:33

## 2019-01-25 DIAGNOSIS — M75.32 CALCIFIC TENDINITIS OF LEFT SHOULDER REGION: Primary | ICD-10-CM

## 2019-01-25 DIAGNOSIS — M19.012 PRIMARY OSTEOARTHRITIS, LEFT SHOULDER: ICD-10-CM

## 2019-02-25 LAB
ANION GAP SERPL CALCULATED.3IONS-SCNC: 14 MEQ/L (ref 9–15)
BUN BLDV-MCNC: 18 MG/DL (ref 8–23)
CHLORIDE BLD-SCNC: 103 MEQ/L (ref 95–107)
CHOLESTEROL, TOTAL: 183 MG/DL (ref 0–199)
CO2: 24 MEQ/L (ref 20–31)
CREAT SERPL-MCNC: 1.25 MG/DL (ref 0.5–0.9)
GFR AFRICAN AMERICAN: 49.4
GFR NON-AFRICAN AMERICAN: 40.8
HDLC SERPL-MCNC: 66 MG/DL (ref 40–59)
LDL CHOLESTEROL CALCULATED: 81 MG/DL (ref 0–129)
POTASSIUM SERPL-SCNC: 4.5 MEQ/L (ref 3.4–4.9)
SODIUM BLD-SCNC: 141 MEQ/L (ref 135–144)
TOTAL CK: 43 U/L (ref 0–170)
TRIGL SERPL-MCNC: 179 MG/DL (ref 0–150)

## 2019-02-27 ENCOUNTER — HOSPITAL ENCOUNTER (OUTPATIENT)
Dept: ORTHOPEDIC SURGERY | Age: 84
Discharge: HOME | End: 2019-03-01
Payer: COMMERCIAL

## 2019-02-27 DIAGNOSIS — M25.562 LEFT KNEE PAIN, UNSPECIFIED CHRONICITY: ICD-10-CM

## 2019-02-27 DIAGNOSIS — M25.561 RIGHT KNEE PAIN, UNSPECIFIED CHRONICITY: ICD-10-CM

## 2019-02-27 PROCEDURE — 73560 X-RAY EXAM OF KNEE 1 OR 2: CPT

## 2019-03-07 ENCOUNTER — HOSPITAL ENCOUNTER (OUTPATIENT)
Dept: GENERAL RADIOLOGY | Age: 84
Discharge: HOME | End: 2019-03-09
Payer: COMMERCIAL

## 2019-03-07 VITALS — SYSTOLIC BLOOD PRESSURE: 180 MMHG | HEART RATE: 68 BPM | DIASTOLIC BLOOD PRESSURE: 63 MMHG

## 2019-03-07 DIAGNOSIS — M75.32 CALCIFIC TENDINITIS OF LEFT SHOULDER: ICD-10-CM

## 2019-03-07 PROCEDURE — 6360000002 HC RX W HCPCS: Performed by: ORTHOPAEDIC SURGERY

## 2019-03-07 PROCEDURE — 6360000004 HC RX CONTRAST MEDICATION: Performed by: ORTHOPAEDIC SURGERY

## 2019-03-07 PROCEDURE — 20610 DRAIN/INJ JOINT/BURSA W/O US: CPT

## 2019-03-07 PROCEDURE — 2500000003 HC RX 250 WO HCPCS: Performed by: ORTHOPAEDIC SURGERY

## 2019-03-07 RX ORDER — LIDOCAINE HYDROCHLORIDE 20 MG/ML
20 INJECTION, SOLUTION INFILTRATION; PERINEURAL ONCE
Status: COMPLETED | OUTPATIENT
Start: 2019-03-07 | End: 2019-03-07

## 2019-03-07 RX ORDER — BUPIVACAINE HYDROCHLORIDE 5 MG/ML
30 INJECTION, SOLUTION EPIDURAL; INTRACAUDAL ONCE
Status: COMPLETED | OUTPATIENT
Start: 2019-03-07 | End: 2019-03-07

## 2019-03-07 RX ORDER — TRIAMCINOLONE ACETONIDE 40 MG/ML
40 INJECTION, SUSPENSION INTRA-ARTICULAR; INTRAMUSCULAR ONCE
Status: COMPLETED | OUTPATIENT
Start: 2019-03-07 | End: 2019-03-07

## 2019-03-07 RX ADMIN — IOVERSOL 4 ML: 678 INJECTION INTRA-ARTERIAL; INTRAVENOUS at 14:31

## 2019-03-07 RX ADMIN — BUPIVACAINE HYDROCHLORIDE 3 ML: 5 INJECTION, SOLUTION EPIDURAL; INTRACAUDAL at 14:30

## 2019-03-07 RX ADMIN — TRIAMCINOLONE ACETONIDE 40 MG: 40 INJECTION, SUSPENSION INTRA-ARTICULAR; INTRAMUSCULAR at 14:33

## 2019-03-07 RX ADMIN — LIDOCAINE HYDROCHLORIDE 10 ML: 20 INJECTION, SOLUTION INFILTRATION; PERINEURAL at 14:32

## 2019-03-07 ASSESSMENT — PAIN SCALES - GENERAL
PAINLEVEL_OUTOF10: 3
PAINLEVEL_OUTOF10: 1

## 2019-04-15 RX ORDER — FUROSEMIDE 20 MG/1
TABLET ORAL
Qty: 90 TABLET | Refills: 1 | Status: SHIPPED | OUTPATIENT
Start: 2019-04-15 | End: 2019-10-08 | Stop reason: SDUPTHER

## 2019-04-15 RX ORDER — LEVOTHYROXINE SODIUM 112 UG/1
TABLET ORAL
Qty: 90 TABLET | Refills: 1 | Status: SHIPPED | OUTPATIENT
Start: 2019-04-15 | End: 2019-10-08 | Stop reason: SDUPTHER

## 2019-04-15 RX ORDER — FAMOTIDINE 40 MG/1
TABLET, FILM COATED ORAL
Qty: 90 TABLET | Refills: 1 | Status: SHIPPED | OUTPATIENT
Start: 2019-04-15 | End: 2019-10-08 | Stop reason: SDUPTHER

## 2019-04-20 ENCOUNTER — HOSPITAL ENCOUNTER (OUTPATIENT)
Dept: MRI IMAGING | Age: 84
Discharge: HOME | End: 2019-04-22
Payer: COMMERCIAL

## 2019-04-20 DIAGNOSIS — M19.012 OSTEOARTHRITIS OF LEFT SHOULDER, UNSPECIFIED OSTEOARTHRITIS TYPE: ICD-10-CM

## 2019-04-20 PROCEDURE — 73221 MRI JOINT UPR EXTREM W/O DYE: CPT

## 2019-05-14 ENCOUNTER — OFFICE VISIT (OUTPATIENT)
Dept: FAMILY MEDICINE CLINIC | Age: 84
End: 2019-05-14
Payer: COMMERCIAL

## 2019-05-14 VITALS
OXYGEN SATURATION: 94 % | HEIGHT: 61 IN | BODY MASS INDEX: 27.56 KG/M2 | WEIGHT: 146 LBS | TEMPERATURE: 96.4 F | DIASTOLIC BLOOD PRESSURE: 50 MMHG | SYSTOLIC BLOOD PRESSURE: 120 MMHG | HEART RATE: 70 BPM

## 2019-05-14 DIAGNOSIS — N76.0 ACUTE VAGINITIS: Primary | ICD-10-CM

## 2019-05-14 DIAGNOSIS — N76.0 ACUTE VAGINITIS: ICD-10-CM

## 2019-05-14 LAB
CLUE CELLS: NORMAL
TRICHOMONAS PREP: NORMAL
TRICHOMONAS VAGINALIS SCREEN: NEGATIVE
YEAST WET PREP: NORMAL

## 2019-05-14 PROCEDURE — G8427 DOCREV CUR MEDS BY ELIG CLIN: HCPCS | Performed by: FAMILY MEDICINE

## 2019-05-14 PROCEDURE — G8399 PT W/DXA RESULTS DOCUMENT: HCPCS | Performed by: FAMILY MEDICINE

## 2019-05-14 PROCEDURE — 1036F TOBACCO NON-USER: CPT | Performed by: FAMILY MEDICINE

## 2019-05-14 PROCEDURE — 4040F PNEUMOC VAC/ADMIN/RCVD: CPT | Performed by: FAMILY MEDICINE

## 2019-05-14 PROCEDURE — 1090F PRES/ABSN URINE INCON ASSESS: CPT | Performed by: FAMILY MEDICINE

## 2019-05-14 PROCEDURE — 99213 OFFICE O/P EST LOW 20 MIN: CPT | Performed by: FAMILY MEDICINE

## 2019-05-14 PROCEDURE — G8598 ASA/ANTIPLAT THER USED: HCPCS | Performed by: FAMILY MEDICINE

## 2019-05-14 PROCEDURE — G8417 CALC BMI ABV UP PARAM F/U: HCPCS | Performed by: FAMILY MEDICINE

## 2019-05-14 PROCEDURE — 1123F ACP DISCUSS/DSCN MKR DOCD: CPT | Performed by: FAMILY MEDICINE

## 2019-05-14 RX ORDER — FLUCONAZOLE 150 MG/1
150 TABLET ORAL ONCE
Qty: 1 TABLET | Refills: 0 | Status: SHIPPED | OUTPATIENT
Start: 2019-05-14 | End: 2020-06-22 | Stop reason: SDUPTHER

## 2019-05-14 ASSESSMENT — PATIENT HEALTH QUESTIONNAIRE - PHQ9
2. FEELING DOWN, DEPRESSED OR HOPELESS: 0
1. LITTLE INTEREST OR PLEASURE IN DOING THINGS: 0
SUM OF ALL RESPONSES TO PHQ QUESTIONS 1-9: 0
SUM OF ALL RESPONSES TO PHQ QUESTIONS 1-9: 0
SUM OF ALL RESPONSES TO PHQ9 QUESTIONS 1 & 2: 0

## 2019-05-14 NOTE — PATIENT INSTRUCTIONS
Patient Education        Vaginitis: Care Instructions  Your Care Instructions    Vaginitis is soreness or infection of the vagina. This common problem can cause itching and burning. And it can cause a change in vaginal discharge. Sometimes it can cause pain during sex. Vaginitis may be caused by bacteria, yeast, or other germs. Some infections that cause it are caught from a sexual partner. Bath products, spermicides, and douches can irritate the vagina too. Some women have this problem during and after menopause. A drop in estrogen levels during this time can cause dryness, soreness, and pain during sex. Your doctor can give you medicine to treat an infection. And home care may help you feel better. For certain types of infections, your sex partner must be treated too. Follow-up care is a key part of your treatment and safety. Be sure to make and go to all appointments, and call your doctor if you are having problems. It's also a good idea to know your test results and keep a list of the medicines you take. How can you care for yourself at home? · If your doctor prescribed antibiotics, take them as directed. Do not stop taking them just because you feel better. You need to take the full course of antibiotics. · Take your medicines exactly as prescribed. Call your doctor if you think you are having a problem with your medicine. · Do not eat or drink anything that has alcohol if you are taking metronidazole (Flagyl). · If you have a yeast infection, use over-the-counter products as your doctor tells you to. Or take medicine your doctor prescribes exactly as directed. · Wash your vaginal area daily with water. You also can use a mild, unscented soap if you want. · Do not use scented bath products. And do not use vaginal sprays or douches. · Put a washcloth soaked in cool water on the area to relieve itching. Or you can take cool baths.   · If you have dryness because of menopause, use estrogen cream or pills

## 2019-05-14 NOTE — PROGRESS NOTES
MG tablet Take 10 mg by mouth daily      lidocaine (LIDODERM) 5 % Place 1 patch onto the skin daily 12 hours on, 12 hours off. 30 patch 0    aspirin (RA ASPIRIN ADULT LOW STRENGTH) 81 MG EC tablet take 2 tablets by mouth once daily 180 tablet 2    Cyanocobalamin (VITAMIN B-12 PO) Take  by mouth daily.  Omega-3 Fatty Acids (OMEGA 3 PO) Take 120 mg by mouth daily.  fluticasone (FLONASE) 50 MCG/ACT nasal spray 2 sprays by Nasal route daily.  nitroGLYCERIN (NITROQUICK) 0.4 MG SL tablet Place 1 tablet under the tongue every 5 minutes as needed. 25 tablet 6    Calcium Carb-Cholecalciferol (OYSTER SHELL CALCIUM + D PO) Take 1 tablet by mouth daily.  ONE TOUCH ULTRASOFT LANCETS Test once daily and as directed       vitamin D (CHOLECALCIFEROL) 1000 UNIT TABS tablet Take 1,000 Int'l Units by mouth daily.  famotidine (PEPCID) 40 MG tablet Take 1 tablet by mouth nightly as needed (heartburn) 90 tablet 1     No current facility-administered medications for this visit. ROS  CONSTITUTIONAL: The patient denies fevers, chills, sweats and body ache. HEENT: Denies headache, blurry vision, eye pain, tinnitus, vertigo,  sore throat, neck or thyroid masses. RESPIRATORY: Denies cough, sputum, hemoptysis. CARDIAC: Denies chest pain, pressure, palpitations, Denies lower extremity edema. GASTROINTESTINAL: Denies abdominal pain, constipation, diarrhea, bleeding in the stools,   GENITOURINARY: Denies dysuria, hematuria, nocturia or frequency, urinary incontinence. NEUROLOGIC: Denies headaches, dizziness, syncope, weakness  MUSCULOSKELETAL: denies changes in range of motion, joint pain, stiffness. ENDOCRINOLOGY: Denies heat or cold intolerance. HEMATOLOGY: Denies easy bleeding or blood transfusion,anemia  DERMATOLOGY: Denies changes in moles or pigmentation changes. PSYCHIATRY: Denies depression, agitation or anxiety.       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     Diabetes mellitus type II     DJD (degenerative joint disease) of knee     Dr Jerel Skelton     Emphysema lung ([facility] [address]     H/O colonoscopy 10/02/2012    dr Mati Goyal H/O mammogram ,     HTN (hypertension)     Learning disability     on SSI    Osteoarthritis of shoulder 2019    Osteopenia 2015    S/P left mastectomy 1985    cancer, no rx    Senile osteopenia     Tubular adenoma of colon         Past Surgical History:   Procedure Laterality Date     SECTION  x 3    MASTECTOMY, RADICAL Left     breast cancer        Family History   Problem Relation Age of Onset    Diabetes Mother         dec age 80        Social History     Socioeconomic History    Marital status:      Spouse name: Not on file    Number of children: 3    Years of education: Not on file    Highest education level: Not on file   Occupational History    Occupation: homemaker, SSI for learning disability   Social Needs    Financial resource strain: Not on file    Food insecurity:     Worry: Not on file     Inability: Not on file    Transportation needs:     Medical: Not on file     Non-medical: Not on file   Tobacco Use    Smoking status: Former Smoker     Last attempt to quit: 4/10/2007     Years since quittin.1    Smokeless tobacco: Never Used   Substance and Sexual Activity    Alcohol use: No    Drug use: No    Sexual activity: Not on file   Lifestyle    Physical activity:     Days per week: Not on file     Minutes per session: Not on file    Stress: Not on file   Relationships    Social connections:     Talks on phone: Not on file     Gets together: Not on file     Attends Jain service: Not on file     Active member of club or organization: Not on file     Attends meetings of clubs or

## 2019-05-17 LAB
C TRACH DNA GENITAL QL NAA+PROBE: NEGATIVE
N. GONORRHOEAE DNA: NEGATIVE

## 2019-06-25 ENCOUNTER — TELEPHONE (OUTPATIENT)
Dept: INTERNAL MEDICINE CLINIC | Age: 84
End: 2019-06-25

## 2019-07-02 ENCOUNTER — TELEPHONE (OUTPATIENT)
Dept: FAMILY MEDICINE CLINIC | Age: 84
End: 2019-07-02

## 2019-07-10 ENCOUNTER — TELEPHONE (OUTPATIENT)
Dept: FAMILY MEDICINE CLINIC | Age: 84
End: 2019-07-10

## 2019-07-10 NOTE — TELEPHONE ENCOUNTER
1.  Pt treated in May for vaginitis. Pt continues with severe itching. Needs something stronger. 2.  Pt also in need of blood glucose monitor. It is no longer working properly. Caretye nurse is at the home and assures it would be covered.     Pharm: [facility] on [address] with any questions at [phone]

## 2019-07-12 NOTE — TELEPHONE ENCOUNTER
Pt's daughter, Danny Batista, returned call to office.   She states she can be reached today at phone # [phone] and if no answer, please try work ph# [phone]

## 2019-07-18 DIAGNOSIS — E11.21 CONTROLLED TYPE 2 DIABETES MELLITUS WITH PROTEINURIC DIABETIC NEPHROPATHY (HCC): ICD-10-CM

## 2019-07-19 RX ORDER — ISOSORBIDE MONONITRATE 120 MG/1
TABLET, EXTENDED RELEASE ORAL
Qty: 90 TABLET | Refills: 1 | Status: SHIPPED | OUTPATIENT
Start: 2019-07-19

## 2019-07-31 DIAGNOSIS — E11.21 CONTROLLED TYPE 2 DIABETES MELLITUS WITH PROTEINURIC DIABETIC NEPHROPATHY (HCC): Primary | ICD-10-CM

## 2019-07-31 RX ORDER — BLOOD-GLUCOSE METER
KIT MISCELLANEOUS
Qty: 100 EACH | Refills: 2 | Status: SHIPPED | OUTPATIENT
Start: 2019-07-31 | End: 2019-12-30 | Stop reason: SDUPTHER

## 2019-07-31 RX ORDER — GLUCOSAMINE HCL/CHONDROITIN SU 500-400 MG
CAPSULE ORAL
Qty: 100 STRIP | Refills: 2 | Status: SHIPPED | OUTPATIENT
Start: 2019-07-31 | End: 2019-12-30 | Stop reason: SDUPTHER

## 2019-09-04 ENCOUNTER — OFFICE VISIT (OUTPATIENT)
Dept: FAMILY MEDICINE CLINIC | Age: 84
End: 2019-09-04
Payer: COMMERCIAL

## 2019-09-04 VITALS
SYSTOLIC BLOOD PRESSURE: 124 MMHG | DIASTOLIC BLOOD PRESSURE: 62 MMHG | BODY MASS INDEX: 26.36 KG/M2 | TEMPERATURE: 97.4 F | OXYGEN SATURATION: 97 % | HEART RATE: 68 BPM | WEIGHT: 139.6 LBS | HEIGHT: 61 IN | RESPIRATION RATE: 16 BRPM

## 2019-09-04 DIAGNOSIS — J30.9 ALLERGIC RHINITIS, UNSPECIFIED SEASONALITY, UNSPECIFIED TRIGGER: ICD-10-CM

## 2019-09-04 DIAGNOSIS — B96.89 BACTERIAL SKIN INFECTION: ICD-10-CM

## 2019-09-04 DIAGNOSIS — S50.812A CAT SCRATCH OF FOREARM, LEFT, INITIAL ENCOUNTER: Primary | ICD-10-CM

## 2019-09-04 DIAGNOSIS — N76.0 VAGINOSIS: ICD-10-CM
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
## 2019-09-04 DIAGNOSIS — L08.9 BACTERIAL SKIN INFECTION: ICD-10-CM

## 2019-09-04 PROCEDURE — 99214 OFFICE O/P EST MOD 30 MIN: CPT | Performed by: PHYSICIAN ASSISTANT

## 2019-09-04 PROCEDURE — 1036F TOBACCO NON-USER: CPT | Performed by: PHYSICIAN ASSISTANT

## 2019-09-04 PROCEDURE — 1123F ACP DISCUSS/DSCN MKR DOCD: CPT | Performed by: PHYSICIAN ASSISTANT

## 2019-09-04 PROCEDURE — G8399 PT W/DXA RESULTS DOCUMENT: HCPCS | Performed by: PHYSICIAN ASSISTANT

## 2019-09-04 PROCEDURE — 1090F PRES/ABSN URINE INCON ASSESS: CPT | Performed by: PHYSICIAN ASSISTANT

## 2019-09-04 PROCEDURE — 4040F PNEUMOC VAC/ADMIN/RCVD: CPT | Performed by: PHYSICIAN ASSISTANT

## 2019-09-04 PROCEDURE — G8427 DOCREV CUR MEDS BY ELIG CLIN: HCPCS | Performed by: PHYSICIAN ASSISTANT

## 2019-09-04 PROCEDURE — G8417 CALC BMI ABV UP PARAM F/U: HCPCS | Performed by: PHYSICIAN ASSISTANT

## 2019-09-04 PROCEDURE — G8598 ASA/ANTIPLAT THER USED: HCPCS | Performed by: PHYSICIAN ASSISTANT

## 2019-09-04 RX ORDER — AMOXICILLIN AND CLAVULANATE POTASSIUM 875; 125 MG/1; MG/1
1 TABLET, FILM COATED ORAL 2 TIMES DAILY
Qty: 20 TABLET | Refills: 0 | Status: SHIPPED | OUTPATIENT
Start: 2019-09-04 | End: 2019-09-14

## 2019-09-04 RX ORDER — AMOXICILLIN 500 MG/1
500 CAPSULE ORAL 2 TIMES DAILY
Qty: 20 CAPSULE | Refills: 0 | Status: SHIPPED | OUTPATIENT
Start: 2019-09-04 | End: 2019-09-04

## 2019-09-04 RX ORDER — BENZONATATE 200 MG/1
200 CAPSULE ORAL 3 TIMES DAILY PRN
Qty: 30 CAPSULE | Refills: 0 | Status: SHIPPED | OUTPATIENT
Start: 2019-09-04 | End: 2019-09-11

## 2019-09-04 RX ORDER — LORATADINE 10 MG/1
10 TABLET ORAL NIGHTLY PRN
Qty: 30 TABLET | Refills: 1 | Status: SHIPPED | OUTPATIENT
Start: 2019-09-04

## 2019-09-04 RX ORDER — NYSTATIN 100000 U/G
CREAM TOPICAL
Qty: 30 G | Refills: 1 | Status: SHIPPED | OUTPATIENT
Start: 2019-09-04 | End: 2020-02-05 | Stop reason: SDUPTHER

## 2019-09-04 RX ORDER — FLUCONAZOLE 150 MG/1
150 TABLET ORAL ONCE
Qty: 1 TABLET | Refills: 1 | Status: SHIPPED | OUTPATIENT
Start: 2019-09-04 | End: 2019-09-04

## 2019-09-04 ASSESSMENT — ENCOUNTER SYMPTOMS
VOMITING: 0
NAUSEA: 0
BACK PAIN: 1
SHORTNESS OF BREATH: 0
COUGH: 1
SORE THROAT: 0
CONSTIPATION: 0
EYE PAIN: 0
DIARRHEA: 0

## 2019-09-04 NOTE — PROGRESS NOTES
Subjective  Virgie Urrutia, 80 y.o. female presents today with:  Chief Complaint   Patient presents with    Vaginal Itching     Patient c/o vaginal itching rash and pain x3 days. Patient states she had this 3 months ago and it went away.  Health Maintenance     Declines         Treatment Adherence:   Medication compliance:  compliant most of the time  Diet compliance:  compliant most of the time  Weight trend: stable  Current exercise: no regular exercise      Diabetes Mellitus Type 2: Current symptoms/problems include none. Home blood sugar records:  fasting range: 300  Any episodes of hypoglycemia? no  Eye exam current (within one year): no  Tobacco history: She  reports that she quit smoking about 12 years ago. She has never used smokeless tobacco.   Daily Aspirin? Yes  Known diabetic complications: none        Lab Results   Component Value Date    LABA1C 7.1 10/16/2018    LABA1C 7.7 06/15/2018    LABA1C 8.5 03/09/2018     Lab Results   Component Value Date    LABMICR 52.30 (H) 04/21/2015    CREATININE 1.11 (H) 07/25/2019     Lab Results   Component Value Date    ALT 12 01/15/2015    AST 16 01/15/2015     Lab Results   Component Value Date    CHOL 183 02/25/2019    TRIG 179 (H) 02/25/2019    HDL 66 (H) 02/25/2019    LDLCALC 81 02/25/2019          HPI  Patient is here with complaint of vaginal itching the past 3 days admits to frequent cleansing/rubbing of the area  States her cat scratched her arm last nite - now a bit red and swollen  C.o cough productive of thick yellow mucous worse when lying down and post nasal drip    Review of Systems   Constitutional: Positive for fatigue. HENT: Positive for congestion. Negative for sore throat. Eyes: Negative for pain. Respiratory: Positive for cough. Negative for shortness of breath. Gastrointestinal: Negative for constipation, diarrhea, nausea and vomiting. Genitourinary: Positive for vaginal pain.    Musculoskeletal: Positive for back pain and

## 2019-10-08 RX ORDER — PRAVASTATIN SODIUM 40 MG
TABLET ORAL
Qty: 90 TABLET | Refills: 1 | Status: SHIPPED | OUTPATIENT
Start: 2019-10-08 | End: 2020-05-27 | Stop reason: SDUPTHER

## 2019-10-08 RX ORDER — LISINOPRIL 20 MG/1
TABLET ORAL
Qty: 180 TABLET | Refills: 1 | Status: SHIPPED | OUTPATIENT
Start: 2019-10-08 | End: 2020-05-27 | Stop reason: SDUPTHER

## 2019-10-08 RX ORDER — LEVOTHYROXINE SODIUM 112 UG/1
TABLET ORAL
Qty: 90 TABLET | Refills: 1 | Status: SHIPPED | OUTPATIENT
Start: 2019-10-08 | End: 2020-07-01 | Stop reason: SDUPTHER

## 2019-10-08 RX ORDER — FUROSEMIDE 20 MG/1
TABLET ORAL
Qty: 90 TABLET | Refills: 1 | Status: SHIPPED | OUTPATIENT
Start: 2019-10-08

## 2019-10-08 RX ORDER — FAMOTIDINE 40 MG/1
TABLET, FILM COATED ORAL
Qty: 90 TABLET | Refills: 1 | Status: SHIPPED | OUTPATIENT
Start: 2019-10-08 | End: 2020-05-27 | Stop reason: SDUPTHER

## 2019-10-08 RX ORDER — CLONIDINE HYDROCHLORIDE 0.3 MG/1
TABLET ORAL
Qty: 180 TABLET | Refills: 1 | Status: SHIPPED | OUTPATIENT
Start: 2019-10-08 | End: 2020-05-27 | Stop reason: SDUPTHER

## 2019-10-18 ENCOUNTER — OFFICE VISIT (OUTPATIENT)
Dept: PULMONOLOGY | Age: 84
End: 2019-10-18
Payer: COMMERCIAL

## 2019-10-18 VITALS
HEART RATE: 68 BPM | BODY MASS INDEX: 26.43 KG/M2 | TEMPERATURE: 97.5 F | WEIGHT: 140 LBS | OXYGEN SATURATION: 95 % | RESPIRATION RATE: 16 BRPM | DIASTOLIC BLOOD PRESSURE: 64 MMHG | HEIGHT: 61 IN | SYSTOLIC BLOOD PRESSURE: 126 MMHG

## 2019-10-18 DIAGNOSIS — E66.3 OVERWEIGHT (BMI 25.0-29.9): ICD-10-CM

## 2019-10-18 DIAGNOSIS — G47.33 OSA ON CPAP: Primary | ICD-10-CM

## 2019-10-18 DIAGNOSIS — J30.1 NON-SEASONAL ALLERGIC RHINITIS DUE TO POLLEN: ICD-10-CM

## 2019-10-18 DIAGNOSIS — Z99.89 OSA ON CPAP: Primary | ICD-10-CM

## 2019-10-18 PROCEDURE — G8598 ASA/ANTIPLAT THER USED: HCPCS | Performed by: INTERNAL MEDICINE

## 2019-10-18 PROCEDURE — 1123F ACP DISCUSS/DSCN MKR DOCD: CPT | Performed by: INTERNAL MEDICINE

## 2019-10-18 PROCEDURE — 99203 OFFICE O/P NEW LOW 30 MIN: CPT | Performed by: INTERNAL MEDICINE

## 2019-10-18 PROCEDURE — G8417 CALC BMI ABV UP PARAM F/U: HCPCS | Performed by: INTERNAL MEDICINE

## 2019-10-18 PROCEDURE — 4040F PNEUMOC VAC/ADMIN/RCVD: CPT | Performed by: INTERNAL MEDICINE

## 2019-10-18 PROCEDURE — 1036F TOBACCO NON-USER: CPT | Performed by: INTERNAL MEDICINE

## 2019-10-18 PROCEDURE — G8427 DOCREV CUR MEDS BY ELIG CLIN: HCPCS | Performed by: INTERNAL MEDICINE

## 2019-10-18 PROCEDURE — G8484 FLU IMMUNIZE NO ADMIN: HCPCS | Performed by: INTERNAL MEDICINE

## 2019-10-18 PROCEDURE — 1090F PRES/ABSN URINE INCON ASSESS: CPT | Performed by: INTERNAL MEDICINE

## 2019-10-18 PROCEDURE — G8399 PT W/DXA RESULTS DOCUMENT: HCPCS | Performed by: INTERNAL MEDICINE

## 2019-10-18 RX ORDER — FLUTICASONE PROPIONATE 50 MCG
1 SPRAY, SUSPENSION (ML) NASAL DAILY
Qty: 2 BOTTLE | Refills: 1 | Status: SHIPPED | OUTPATIENT
Start: 2019-10-18 | End: 2019-12-24 | Stop reason: SDUPTHER

## 2019-10-18 ASSESSMENT — ENCOUNTER SYMPTOMS
TROUBLE SWALLOWING: 0
COUGH: 0
VOICE CHANGE: 0
SHORTNESS OF BREATH: 0
WHEEZING: 0
SINUS PRESSURE: 0
ABDOMINAL PAIN: 0
NAUSEA: 0
CHEST TIGHTNESS: 0
VOMITING: 0
DIARRHEA: 0
EYE ITCHING: 0
SORE THROAT: 0
RHINORRHEA: 0
EYE DISCHARGE: 0

## 2019-12-18 RX ORDER — AMLODIPINE BESYLATE 5 MG/1
TABLET ORAL
Qty: 90 TABLET | Refills: 0 | Status: SHIPPED | OUTPATIENT
Start: 2019-12-18 | End: 2020-02-05 | Stop reason: SDUPTHER

## 2019-12-24 RX ORDER — FLUTICASONE PROPIONATE 50 MCG
1 SPRAY, SUSPENSION (ML) NASAL DAILY
Qty: 2 BOTTLE | Refills: 1 | Status: SHIPPED | OUTPATIENT
Start: 2019-12-24

## 2019-12-30 ENCOUNTER — TELEPHONE (OUTPATIENT)
Dept: FAMILY MEDICINE CLINIC | Age: 84
End: 2019-12-30

## 2019-12-30 DIAGNOSIS — E11.21 CONTROLLED TYPE 2 DIABETES MELLITUS WITH PROTEINURIC DIABETIC NEPHROPATHY (HCC): ICD-10-CM

## 2019-12-30 RX ORDER — MELATONIN
1000 DAILY
Qty: 90 TABLET | Refills: 1 | Status: SHIPPED | OUTPATIENT
Start: 2019-12-30

## 2019-12-30 RX ORDER — GLUCOSAMINE HCL/CHONDROITIN SU 500-400 MG
CAPSULE ORAL
Qty: 100 STRIP | Refills: 0 | Status: SHIPPED | OUTPATIENT
Start: 2019-12-30 | End: 2021-02-19

## 2019-12-30 RX ORDER — BLOOD-GLUCOSE METER
KIT MISCELLANEOUS
Qty: 100 EACH | Refills: 0 | Status: SHIPPED | OUTPATIENT
Start: 2019-12-30 | End: 2020-01-03 | Stop reason: SDUPTHER

## 2019-12-30 RX ORDER — CHOLECALCIFEROL (VITAMIN D3) 125 MCG
500 CAPSULE ORAL DAILY
Qty: 90 TABLET | Refills: 1 | Status: SHIPPED | OUTPATIENT
Start: 2019-12-30 | End: 2020-06-27

## 2020-01-03 RX ORDER — BLOOD-GLUCOSE METER
KIT MISCELLANEOUS
Qty: 100 EACH | Refills: 0 | Status: SHIPPED | OUTPATIENT
Start: 2020-01-03

## 2020-02-05 RX ORDER — AMLODIPINE BESYLATE 5 MG/1
5 TABLET ORAL DAILY
Qty: 90 TABLET | Refills: 0 | Status: SHIPPED | OUTPATIENT
Start: 2020-02-05 | End: 2020-03-17

## 2020-02-05 RX ORDER — NYSTATIN 100000 U/G
CREAM TOPICAL
Qty: 30 G | Refills: 2 | Status: SHIPPED | OUTPATIENT
Start: 2020-02-05 | End: 2020-04-09 | Stop reason: SDUPTHER

## 2020-03-17 RX ORDER — AMLODIPINE BESYLATE 5 MG/1
TABLET ORAL
Qty: 90 TABLET | Refills: 0 | Status: SHIPPED | OUTPATIENT
Start: 2020-03-17 | End: 2020-09-28

## 2020-03-17 NOTE — TELEPHONE ENCOUNTER
Pharmacy requesting medication refill.  Please approve or deny this request.    Rx requested:  Requested Prescriptions     Pending Prescriptions Disp Refills    amLODIPine (NORVASC) 5 MG tablet [Pharmacy Med Name: AMLODIPINE BESYLATE 5 MG TAB] 90 tablet 0     Sig: take 1 tablet by mouth once daily         Last Office Visit:   5/14/2019      Next Visit Date:  Future Appointments   Date Time Provider Sandra Vanegas   4/21/2020 11:00 AM Neeru Moore MD [facility]

## 2020-03-26 ENCOUNTER — TELEPHONE (OUTPATIENT)
Dept: FAMILY MEDICINE CLINIC | Age: 85
End: 2020-03-26

## 2020-03-26 NOTE — TELEPHONE ENCOUNTER
Daughter called back and stated it would not be possible at this time to do a phone or video visit. She is not able to be with her mother.

## 2020-04-09 ENCOUNTER — VIRTUAL VISIT (OUTPATIENT)
Dept: FAMILY MEDICINE CLINIC | Age: 85
End: 2020-04-09
Payer: COMMERCIAL

## 2020-04-09 PROCEDURE — 99214 OFFICE O/P EST MOD 30 MIN: CPT | Performed by: FAMILY MEDICINE

## 2020-04-09 RX ORDER — FLUCONAZOLE 100 MG/1
100 TABLET ORAL DAILY
Qty: 3 TABLET | Refills: 0 | Status: CANCELLED | OUTPATIENT
Start: 2020-04-09 | End: 2020-04-12

## 2020-04-09 ASSESSMENT — PATIENT HEALTH QUESTIONNAIRE - PHQ9
SUM OF ALL RESPONSES TO PHQ QUESTIONS 1-9: 0
2. FEELING DOWN, DEPRESSED OR HOPELESS: 0
SUM OF ALL RESPONSES TO PHQ9 QUESTIONS 1 & 2: 0
SUM OF ALL RESPONSES TO PHQ QUESTIONS 1-9: 0
1. LITTLE INTEREST OR PLEASURE IN DOING THINGS: 0

## 2020-04-09 NOTE — PROGRESS NOTES
EXAMINED]  [x] Alert  [x] Oriented to person/place/time    [x] No apparent distress  [] Toxic appearing    [] Face flushed appearing [] Sclera clear  [] Lips are cyanotic      [x] Breathing appears normal  [] Appears tachypneic      [] Rash on visible skin    [] Cranial Nerves II-XII grossly intact    [] Motor grossly intact in visible upper extremities    [] Motor grossly intact in visible lower extremities    [x] Normal Mood  [] Anxious appearing    [] Depressed appearing  [] Confused appearing      [] Poor short term memory  [] Poor long term memory    [] OTHER:      Due to this being a TeleHealth encounter, evaluation of the following organ systems is limited: Vitals/Constitutional/EENT/Resp/CV/GI//MS/Neuro/Skin/Heme-Lymph-Imm. ASSESSMENT/PLAN:  1. Candida vaginitis    - nystatin (MYCOSTATIN) 858603 UNIT/GM cream; Apply topically 2 times daily. Dispense: 30 g; Refill: 2  - fluconazole (DIFLUCAN) 150 MG tablet; Take 1 dose now. Repeat in 3 days if symptoms occur. Dispense: 2 tablet; Refill: 0    2. 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 Counseled patient on the effects of carbohydrates on her diabetes. 3. Hypertension, unspecified type  We will continue to monitor at this time. Patient's blood pressures increased slightly above the upper limit of normal for her age limits however 2 days prior it was on the lower end of the spectrum. Patient did increase her sodium intake in her diet. Did  patient and her daughter in regards to her sodium intake and how this can affect her blood pressures. No follow-ups on file. An  electronic signature was used to authenticate this note.     --Avani Mason MD on 4/13/2020 at 8:38 AM        Pursuant to the emergency declaration under the [facility] [address] waiver authority and the [employer] and [employer] Appropriations Act, this Virtual  Visit was conducted, with patient's consent, to reduce the patient's risk of exposure to COVID-19 and provide continuity of care for an established patient. Services were provided through a video synchronous discussion virtually to substitute for in-person clinic visit. I spent greater than 25 minutes in this visit, with more than 50 % of the time devoted to the patient counseling regarding patients concerns, evaluation, prognosis, explanation of diagnosis, risks, and benefits of treatments.

## 2020-04-10 RX ORDER — FLUCONAZOLE 150 MG/1
TABLET ORAL
Qty: 2 TABLET | Refills: 0 | Status: SHIPPED | OUTPATIENT
Start: 2020-04-10 | End: 2020-06-22 | Stop reason: SDUPTHER

## 2020-04-10 RX ORDER — NYSTATIN 100000 U/G
CREAM TOPICAL
Qty: 30 G | Refills: 2 | Status: SHIPPED | OUTPATIENT
Start: 2020-04-10

## 2020-04-13 ASSESSMENT — ENCOUNTER SYMPTOMS
EYE REDNESS: 0
TROUBLE SWALLOWING: 0
EYE DISCHARGE: 0
DIARRHEA: 0
EYE PAIN: 0
NAUSEA: 0
SHORTNESS OF BREATH: 0
CHOKING: 0
CONSTIPATION: 0
PHOTOPHOBIA: 0
STRIDOR: 0
BLOOD IN STOOL: 0
EYE ITCHING: 0
CHEST TIGHTNESS: 0
VOMITING: 0
COUGH: 0
SORE THROAT: 0
SINUS PAIN: 0
ANAL BLEEDING: 0
RHINORRHEA: 0

## 2020-05-27 RX ORDER — PRAVASTATIN SODIUM 40 MG
40 TABLET ORAL DAILY
Qty: 90 TABLET | Refills: 1 | Status: SHIPPED | OUTPATIENT
Start: 2020-05-27 | End: 2020-11-18

## 2020-05-27 RX ORDER — FAMOTIDINE 40 MG/1
TABLET, FILM COATED ORAL
Qty: 90 TABLET | Refills: 1 | Status: SHIPPED | OUTPATIENT
Start: 2020-05-27 | End: 2020-06-22 | Stop reason: SDUPTHER

## 2020-05-27 RX ORDER — CLONIDINE HYDROCHLORIDE 0.3 MG/1
0.3 TABLET ORAL 2 TIMES DAILY
Qty: 180 TABLET | Refills: 1 | Status: SHIPPED | OUTPATIENT
Start: 2020-05-27 | End: 2020-11-18

## 2020-05-27 RX ORDER — LISINOPRIL 20 MG/1
TABLET ORAL
Qty: 180 TABLET | Refills: 1 | Status: SHIPPED | OUTPATIENT
Start: 2020-05-27

## 2020-05-27 NOTE — TELEPHONE ENCOUNTER
Daughter is requesting medication refill. She has confirmed the pharmacy.     Rx requested:  Requested Prescriptions     Pending Prescriptions Disp Refills    pravastatin (PRAVACHOL) 40 MG tablet 90 tablet 1    cloNIDine (CATAPRES) 0.3 MG tablet 180 tablet 1    lisinopril (PRINIVIL;ZESTRIL) 20 MG tablet 180 tablet 1    famotidine (PEPCID) 40 MG tablet 90 tablet 1       Last Office Visit:   5/14/2019    Next Visit Date:  Future Appointments   Date Time Provider Sandra Vanegas   7/7/2020 10:00 AM Gloria Murphy MD [facility]

## 2020-06-22 ENCOUNTER — OFFICE VISIT (OUTPATIENT)
Dept: FAMILY MEDICINE CLINIC | Age: 85
End: 2020-06-22
Payer: COMMERCIAL

## 2020-06-22 PROCEDURE — G0438 PPPS, INITIAL VISIT: HCPCS | Performed by: FAMILY MEDICINE

## 2020-06-22 PROCEDURE — 4040F PNEUMOC VAC/ADMIN/RCVD: CPT | Performed by: FAMILY MEDICINE

## 2020-06-22 PROCEDURE — 1123F ACP DISCUSS/DSCN MKR DOCD: CPT | Performed by: FAMILY MEDICINE

## 2020-06-22 RX ORDER — FLUCONAZOLE 150 MG/1
150 TABLET ORAL ONCE
Qty: 1 TABLET | Refills: 0 | Status: SHIPPED | OUTPATIENT
Start: 2020-06-22 | End: 2020-06-22

## 2020-06-22 RX ORDER — FAMOTIDINE 40 MG/1
TABLET, FILM COATED ORAL
Qty: 90 TABLET | Refills: 1 | Status: SHIPPED | OUTPATIENT
Start: 2020-06-22 | End: 2020-07-02

## 2020-06-22 ASSESSMENT — PATIENT HEALTH QUESTIONNAIRE - PHQ9
SUM OF ALL RESPONSES TO PHQ QUESTIONS 1-9: 1
SUM OF ALL RESPONSES TO PHQ QUESTIONS 1-9: 1

## 2020-06-22 ASSESSMENT — LIFESTYLE VARIABLES: HOW OFTEN DO YOU HAVE A DRINK CONTAINING ALCOHOL: 0

## 2020-06-22 NOTE — PROGRESS NOTES
Medicare Annual Wellness Visit  Are Name: Juan Sanford Date: 2020   MRN: [id number] Sex: Female   Age: 80 y.o. Ethnicity: /   : 1934 Race: Other      Rita Paula is here for No chief complaint on file. Screenings for behavioral, psychosocial and functional/safety risks, and cognitive dysfunction are all negative except as indicated below. These results, as well as other patient data from the [address] form, are documented in Flowsheets linked to this Encounter. No Known Allergies      Prior to Visit Medications    Medication Sig Taking? Authorizing Provider   pravastatin (PRAVACHOL) 40 MG tablet Take 1 tablet by mouth daily  Sammy Washington MD   cloNIDine (CATAPRES) 0.3 MG tablet Take 1 tablet by mouth 2 times daily  Sammy Washington MD   lisinopril (PRINIVIL;ZESTRIL) 20 MG tablet take 1 tablet by mouth twice a day  Sammy Washington MD   famotidine (PEPCID) 40 MG tablet take 1 tablet by mouth at bedtime if needed  Sammy Washington MD   metFORMIN (GLUCOPHAGE) 500 MG tablet take 1 tablet by mouth twice a day with meals  Sammy Washington MD   nystatin (MYCOSTATIN) 575704 UNIT/GM cream Apply topically 2 times daily. Sammy aWshington MD   fluconazole (DIFLUCAN) 150 MG tablet Take 1 dose now. Repeat in 3 days if symptoms occur. Sammy Washington MD   amLODIPine (NORVASC) 5 MG tablet take 1 tablet by mouth once daily  Sammy Washington MD   EZ SMART BLOOD GLUCOSE LANCETS MISC Test  1 to 2 times daily  Sammy Washington MD   blood glucose monitor strips Test 2 times a day & as needed for symptoms of irregular blood glucose.   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     Diabetes mellitus type II     DJD (degenerative joint disease) of knee     Dr Remy Antunez     Emphysema lung ([address]     H/O colonoscopy 10/02/2012    dr Devang Alva H/O mammogram ,     HTN (hypertension)     Learning disability     on SSI    Osteoarthritis of shoulder 2019    Osteopenia 2015    S/P left mastectomy 1985    cancer, no rx    Senile osteopenia     Tubular adenoma of colon        Past Surgical History:   Procedure Laterality Date     SECTION  x 3    MASTECTOMY, RADICAL Left     breast cancer         Family History   Problem Relation Age of Onset    Diabetes Mother         dec age 80       CareTeam (Including outside providers/suppliers regularly involved in providing care):   Patient Care Team:  Kath Calles MD as PCP - General (Family Medicine)  Kath Calles MD as PCP - [facility] Empaneled Provider    Wt Readings from Last 3 Encounters:   10/18/19 140 lb (63.5 kg)   19 139 lb 9.6 oz (63.3 kg)   19 146 lb (66.2 kg)     There were no vitals filed for this visit. There is no height or weight on file to calculate BMI. Based upon direct observation of the patient, evaluation of cognition reveals recent memory intact, remote memory impaired. Patient's complete Health Risk Assessment and screening values have been reviewed and are found in Flowsheets. The following problems were reviewed today and where indicated follow up appointments were made and/or referrals ordered. Positive Risk Factor Screenings with Interventions:     No Positive Risk Factors identified today.     Personalized Preventive Plan   Current Health Maintenance Status  Immunization History   Administered Date(s) Administered    Influenza 11/07/2013    Pneumococcal Conjugate 13-valent ([id number]) 07/03/2017        Health Maintenance   Topic Date Due    DTaP/Tdap/Td vaccine (1 - Tdap) 11/25/1953    Shingles Vaccine (1 of 2) 11/25/1984    Diabetic retinal exam  11/24/2016    Pneumococcal 65+ years Vaccine (2 of 2 - PPSV23) 07/03/2018    Diabetic foot exam  03/09/2019    A1C test (Diabetic or Prediabetic)  04/16/2019    Annual Wellness Visit (AWV)  05/29/2019    Lipid screen  02/25/2020    Potassium monitoring  07/25/2020    Creatinine monitoring  07/25/2020    Flu vaccine (Season Ended) 09/01/2020    Colon cancer screen colonoscopy  10/02/2022    DEXA (modify frequency per FRAX score)  Addressed    Hepatitis A vaccine  Aged Out    Hib vaccine  Aged Out    Meningococcal (ACWY) vaccine  Aged Out     Recommendations for [employer] Due: see orders and patient instructions/AVS.  . Recommended screening schedule for the next 1 years is provided to the patient in written form: see Patient Instructions/AVS.    Diagnoses and all orders for this visit:    Routine general medical examination at a health care facility              Randal Libman is a 80 y.o. female being evaluated by a Virtual Visit (phone) encounter to address concerns as mentioned above. A caregiver was present when appropriate. Due to this being a TeleHealth encounter ([facility] public health emergency), evaluation of the following organ systems was limited: Vitals/Constitutional/EENT/Resp/CV/GI//MS/Neuro/Skin/Heme-Lymph-Imm. Pursuant to the emergency declaration under the [facility], [address] [place] [address] [facility] authority and the [employer] and [employer] Act, this Virtual Visit was conducted with patient's (and/or legal guardian's) consent, to reduce the patient's risk of exposure to COVID-19 and provide necessary medical care.   The

## 2020-07-02 RX ORDER — ASPIRIN 81 MG/1
TABLET ORAL
Qty: 180 TABLET | Refills: 2 | Status: SHIPPED | OUTPATIENT
Start: 2020-07-02 | End: 2021-04-08

## 2020-07-02 RX ORDER — LEVOTHYROXINE SODIUM 112 UG/1
TABLET ORAL
Qty: 90 TABLET | Refills: 1 | Status: SHIPPED | OUTPATIENT
Start: 2020-07-02 | End: 2020-12-10

## 2020-07-02 RX ORDER — FAMOTIDINE 40 MG/1
40 TABLET, FILM COATED ORAL NIGHTLY PRN
Qty: 90 TABLET | Refills: 1 | Status: SHIPPED | OUTPATIENT
Start: 2020-07-02 | End: 2020-08-01

## 2020-07-08 ENCOUNTER — VIRTUAL VISIT (OUTPATIENT)
Dept: PULMONOLOGY | Age: 85
End: 2020-07-08
Payer: COMMERCIAL

## 2020-07-08 PROCEDURE — 99214 OFFICE O/P EST MOD 30 MIN: CPT | Performed by: INTERNAL MEDICINE

## 2020-07-08 ASSESSMENT — ENCOUNTER SYMPTOMS
TROUBLE SWALLOWING: 0
COUGH: 0
VOMITING: 0
CHEST TIGHTNESS: 0
EYE ITCHING: 0
SORE THROAT: 0
DIARRHEA: 0
NAUSEA: 0
EYE DISCHARGE: 0
ABDOMINAL PAIN: 0
VOICE CHANGE: 0
SHORTNESS OF BREATH: 0
RHINORRHEA: 0
WHEEZING: 0
SINUS PRESSURE: 0

## 2020-07-08 NOTE — PROGRESS NOTES
Subjective:     Mark Lauren is a 80 y.o. female who complains today of:     Chief Complaint   Patient presents with    Follow-up       HPI  She is using CPAP with heated humidity. She is using CPAP for about  5-6   hours every night. She is using CPAP with Nasal Mask. She said  sleep is restful with the CPAP use. She is compliant with CPAP therapy and benefiting with CPAP use. No snoring with CPAP use. No complaint of daytime sleepiness or tiredness with CPAP use. She denies taking naps. No sleepiness with driving. She denies difficulty falling asleep or staying asleep. Allergies:  Patient has no known allergies.   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     Diabetes mellitus type II     DJD (degenerative joint disease) of knee     Dr Michael Jackson     Emphysema lung ([address]     H/O colonoscopy 10/02/2012    dr Ro Nguyễn H/O mammogram ,     HTN (hypertension)     Learning disability     on SSI    Osteoarthritis of shoulder 2019    Osteopenia 2015    S/P left mastectomy 1985    cancer, no rx    Senile osteopenia     Tubular adenoma of colon      Past Surgical History:   Procedure Laterality Date     SECTION  x 3    MASTECTOMY, RADICAL Left     breast cancer     Family History   Problem Relation Age of Onset    Diabetes Mother         dec age 80     Social History     Socioeconomic History    Marital status:      Spouse name: Not on file    Number of children: 3    Years of education: Not on file    Highest education level: Not on file   Occupational History    Occupation: homemaker, SSI for learning disability   Social Needs    Financial resource strain: Not on file    Food insecurity     Worry: Not on file     Inability: Not on file    Transportation needs     Medical: Not on file     Non-medical: Not on file   Tobacco Use    Smoking status: Former Smoker     Last attempt to quit: 4/10/2007     Years since quittin.2    Smokeless tobacco: Never Used   Substance and Sexual Activity    Alcohol use: No    Drug use: No    Sexual activity: Not on file   Lifestyle    Physical activity     Days per week: Not on file     Minutes per session: Not on file    Stress: Not on file   Relationships    Social connections     Talks on phone: Not on file     Gets together: Not on file     Attends Holiness service: Not on file     Active member of club or organization: Not on file     Attends meetings of clubs or organizations: Not on file     Relationship status: Not on file    Intimate partner violence     Fear of current or ex partner: Not on file     Emotionally abused: Not on file     Physically abused: Not on file     Forced sexual activity: Not on file   Other Topics Concern    Not on file   Social History Narrative    Born in [facility], older of 3 children, came to the [address] [place] [address] at age 23, sister and brother stayed in [place] [facility] [place]    , homemaker, 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 HENT: Negative for congestion, mouth sores, nosebleeds, postnasal drip, rhinorrhea, sinus pressure, sneezing, sore throat, trouble swallowing and voice change. Eyes: Negative for discharge, itching and visual disturbance. Respiratory: Negative for cough, chest tightness, shortness of breath and wheezing. Cardiovascular: Negative for chest pain, palpitations and leg swelling. Gastrointestinal: Negative for abdominal pain, diarrhea, nausea and vomiting. Genitourinary: Negative for difficulty urinating and hematuria. umeclidinium-vilanterol (ANORO ELLIPTA) 62.5-25 MCG/INH AEPB inhaler Inhale 1 puff into the lungs daily 1 each 2    fluticasone (FLONASE) 50 MCG/ACT nasal spray 1 spray by Each Nostril route daily 2 Bottle 1    Respiratory Therapy Supplies TAVO New CPAP mask , humidified chamber and supplies 1 Device 0    furosemide (LASIX) 20 MG tablet take 1 tablet by mouth once daily 90 tablet 1    loratadine (CLARITIN) 10 MG tablet Take 1 tablet by mouth nightly as needed (allergies) 30 tablet 1    blood glucose monitor kit and supplies Test 2 times a day & as needed for symptoms of irregular blood glucose. 1 kit 0    isosorbide mononitrate (IMDUR) 120 MG extended release tablet take 1 tablet by mouth once daily 90 tablet 1    acetaminophen (AMINOFEN) 325 MG tablet Take 2 tablets by mouth every 6 hours as needed for Pain 120 tablet 0    ezetimibe (ZETIA) 10 MG tablet Take 10 mg by mouth daily      lidocaine (LIDODERM) 5 % Place 1 patch onto the skin daily 12 hours on, 12 hours off. 30 patch 0    Omega-3 Fatty Acids (OMEGA 3 PO) Take 120 mg by mouth daily.  fluticasone (FLONASE) 50 MCG/ACT nasal spray 2 sprays by Nasal route daily.  nitroGLYCERIN (NITROQUICK) 0.4 MG SL tablet Place 1 tablet under the tongue every 5 minutes as needed. 25 tablet 6    Calcium Carb-Cholecalciferol (OYSTER SHELL CALCIUM + D PO) Take 1 tablet by mouth daily.  ONE TOUCH ULTRASOFT LANCETS Test once daily and as directed        No current facility-administered medications for this visit. Results for orders placed during the hospital encounter of 11/02/16   XR CHEST STANDARD TWO VW    Narrative EXAMINATION: CHEST TWO VIEWS  CLINICAL HISTORY: Short of breath  COMPARISONS: July 3, 2013  FINDINGS:  Two views of the chest are submitted. The cardiac silhouette is enlarged, unchanged configuration. The mediastinum is unremarkable. Pulmonary vascular unremarkable. Right sided trachea. No focal infiltrates. No effusions.   No Pneumothoraces. Impression NO ACUTE ACTIVE CARDIOPULMONARY PROCESS       Assessment/Plan:     1. BRANT on CPAP  She is using CPAP with heated humidity. She is using CPAP for about  5-6   hours every night. She is using CPAP with Nasal Mask. She said  sleep is restful with the CPAP use. She is compliant with CPAP therapy and benefiting with CPAP use. No snoring with CPAP use. Continue CPAP therapy as before. Counseling: CPAP/BiPAP uses, patient advised to use CPAP at least 5-6 hours every night. Driving: patient is advised for extreme caution when driving or operating machinery if there is a feeling of drowsiness, especially while driving it is preferable to stop driving and take a brief nap. Sleep hygiene:Avoid supine sleep, sleep on  sides. Avoid  sleep deprivation. Explained sleep hygiene. Advice to avoid Alcohol and sedative    2. Overweight (BMI 25.0-29. 9)  Patient patient is advised try to lose weight. obesity related risk explained to the patient , Suggested weight control approaches, including dietary changes , exercise, behavioral modification. 3. Non-seasonal allergic rhinitis due to pollen  She is using Flonase nasal spray Claritin 10 mg daily. Continue same      Patient notified that this is a billable service and has given verbal consent for telehealth services. Time spent with patient  22 minutes. No follow-ups on file.       Ilya Lam MD

## 2020-09-18 ENCOUNTER — TELEPHONE (OUTPATIENT)
Dept: FAMILY MEDICINE CLINIC | Age: 85
End: 2020-09-18

## 2020-09-18 NOTE — TELEPHONE ENCOUNTER
Thank you for the notification. Please make sure patient takes her blood pressure medicines as prescribed.   As this will help to lower her blood pressures thank you

## 2020-09-18 NOTE — TELEPHONE ENCOUNTER
Naya did yearly assessment due to Pt having [employer] ins. Pt has elevated BP this a.m. & they are required to notify PCP if over a certain range. Be advised Pt had not taken her morning dose of med. 9/18/2020 - 170/80 & repeat was 163/82; HR 72.     Call with any advice. No call needed if this is fine.

## 2020-09-23 LAB
ANION GAP SERPL CALCULATED.3IONS-SCNC: 10 MEQ/L (ref 9–15)
BUN BLDV-MCNC: 29 MG/DL (ref 8–23)
CHLORIDE BLD-SCNC: 101 MEQ/L (ref 95–107)
CO2: 26 MEQ/L (ref 20–31)
CREAT SERPL-MCNC: 1.76 MG/DL (ref 0.5–0.9)
GFR AFRICAN AMERICAN: 33.1
GFR NON-AFRICAN AMERICAN: 27.4
MAGNESIUM: 2.2 MG/DL (ref 1.7–2.4)
POTASSIUM SERPL-SCNC: 4.6 MEQ/L (ref 3.4–4.9)
SODIUM BLD-SCNC: 137 MEQ/L (ref 135–144)

## 2020-09-28 RX ORDER — AMLODIPINE BESYLATE 5 MG/1
TABLET ORAL
Qty: 90 TABLET | Refills: 0 | Status: SHIPPED | OUTPATIENT
Start: 2020-09-28

## 2020-10-26 ENCOUNTER — VIRTUAL VISIT (OUTPATIENT)
Dept: FAMILY MEDICINE CLINIC | Age: 85
End: 2020-10-26
Payer: COMMERCIAL

## 2020-10-26 LAB
ANION GAP SERPL CALCULATED.3IONS-SCNC: 10 MEQ/L (ref 9–15)
BUN BLDV-MCNC: 34 MG/DL (ref 8–23)
CALCIUM SERPL-MCNC: 10 MG/DL (ref 8.5–9.9)
CHLORIDE BLD-SCNC: 105 MEQ/L (ref 95–107)
CO2: 23 MEQ/L (ref 20–31)
CREAT SERPL-MCNC: 1.67 MG/DL (ref 0.5–0.9)
GFR AFRICAN AMERICAN: 35.2
GFR NON-AFRICAN AMERICAN: 29.1
GLUCOSE BLD-MCNC: 149 MG/DL (ref 70–99)
MAGNESIUM: 2 MG/DL (ref 1.7–2.4)
POTASSIUM SERPL-SCNC: 5.6 MEQ/L (ref 3.4–4.9)
SODIUM BLD-SCNC: 138 MEQ/L (ref 135–144)

## 2020-10-26 PROCEDURE — 99442 PR PHYS/QHP TELEPHONE EVALUATION 11-20 MIN: CPT | Performed by: FAMILY MEDICINE

## 2020-10-26 RX ORDER — ACETAMINOPHEN 500 MG
500 TABLET ORAL EVERY 6 HOURS PRN
Qty: 120 TABLET | Refills: 5 | Status: SHIPPED | OUTPATIENT
Start: 2020-10-26

## 2020-10-26 RX ORDER — SPIRONOLACTONE 25 MG/1
25 TABLET ORAL DAILY
Qty: 90 TABLET | Refills: 1 | COMMUNITY
Start: 2020-10-26

## 2020-10-26 RX ORDER — ZOLEDRONIC ACID 5 MG/100ML
5 INJECTION, SOLUTION INTRAVENOUS ONCE
Qty: 100 ML | Refills: 0 | Status: SHIPPED | OUTPATIENT
Start: 2020-10-26 | End: 2020-10-26

## 2020-10-26 ASSESSMENT — ENCOUNTER SYMPTOMS
CHOKING: 0
CHEST TIGHTNESS: 0
NAUSEA: 0
PHOTOPHOBIA: 0
EYE REDNESS: 0
RHINORRHEA: 0
SHORTNESS OF BREATH: 0
EYE PAIN: 0
DIARRHEA: 0
SORE THROAT: 0
ANAL BLEEDING: 0
TROUBLE SWALLOWING: 0
EYE DISCHARGE: 0
STRIDOR: 0
BLOOD IN STOOL: 0
EYE ITCHING: 0
COUGH: 0
VOMITING: 0
CONSTIPATION: 0
SINUS PAIN: 0

## 2020-10-26 NOTE — PROGRESS NOTES
10/26/2020    TELEHEALTH EVALUATION -- Audio/Visual (During MAJLM-39 public health emergency)    Due to [place] 19 outbreak, patient's office visit was converted to a virtual visit. Patient was contacted and agreed to proceed with a virtual visit via Telephone Visit  The risks and benefits of converting to a virtual visit were discussed in light of the current infectious disease epidemic. Patient also understood that insurance coverage and co-pays are up to their individual insurance plans. HPI:    Eitan William (:  1934) has requested an audio/video evaluation for the following concern(s):    Osteoporosis  Patient has a history of osteoporosis. Patient has been in [address] in the past.  Per patient, has only had about 2 sessions of Reclast.  Last DEXA scan was about or more than 2 years ago. Patient needs another DEXA scan. Osteoarthritis  Per daughter, patient has a history of osteoarthritis with pain in her knees lower back and hips. Patient did get knee injections and hip injections in the past.  This was done by an orthopedic specialist.  Daughter is wondering if patient should go back to see the specialist for knee injections. Reports that her pain is worse during the cold time and when it is raining. Was unsure if this was secondary to not getting her Reclast injections or if she needed her steroid injection. Patient denies any fevers, chills, nausea, vomiting, chest pain, shortness of breath, abdominal pain, change urination, change in stools. Diabetes  Patient reports that her sugar levels have been fluctuating. For the most part her sugar levels are around the 170s. However did have some sugar levels that were in the 200s. Patient has been eating more breads and pasta since being under social isolation guidelines due to the coronavirus pandemic.   Lab Results   Component Value Date    LABA1C 7.1 10/16/2018     No results found for: EAG      Hypertension  Patient's blood pressures been managed at home. Stable for the most part. No issues or concerns. Patient denies any fevers, chills, nausea, vomiting, chest pain, shortness of breath, abdominal pain, urination, change in stools. Patient has been trying to manage her diet at home. Patient is fearful to leave her apartment due to the coronavirus pandemic. Patient is not physically active at this time. BP Readings from Last 20 Encounters:   10/18/19 126/64   09/04/19 124/62   05/14/19 (!) 120/50   03/07/19 (!) 180/63   01/23/19 (!) 148/80   12/10/18 (!) 140/82   10/31/18 (!) 140/70   10/16/18 (!) 142/70   08/10/18 (!) 157/68   08/09/18 (!) 144/72   06/15/18 120/80   03/09/18 (!) 157/72   11/03/17 138/82   07/03/17 (!) 100/52   03/02/17 130/88   11/02/16 120/60   09/02/16 110/64   06/21/16 130/68   02/23/16 130/68   11/24/15 138/62   ]    Review of Systems   Constitutional: Negative for activity change, appetite change, diaphoresis, fatigue, fever and unexpected weight change. HENT: Negative for congestion, drooling, ear discharge, hearing loss, mouth sores, nosebleeds, postnasal drip, rhinorrhea, sinus pain, sneezing, sore throat, tinnitus and trouble swallowing. Eyes: Negative for photophobia, pain, discharge, redness and itching. Respiratory: Negative for cough, choking, chest tightness, shortness of breath and stridor. Cardiovascular: Negative for chest pain, palpitations and leg swelling. Gastrointestinal: Negative for anal bleeding, blood in stool, constipation, diarrhea, nausea and vomiting. Genitourinary: Negative for difficulty urinating, dysuria, enuresis, flank pain, frequency, genital sores, vaginal bleeding and vaginal discharge. Musculoskeletal: Positive for arthralgias. Negative for gait problem, joint swelling, neck pain and neck stiffness. Skin: Negative for pallor, rash and wound. Allergic/Immunologic: Negative for environmental allergies, food allergies and immunocompromised state. monitor strips Test 2 times a day & as needed for symptoms of irregular blood glucose. Asher Delgado MD   Cholecalciferol (VITAMIN D3) 25 MCG (1000 UT) TABS Take 1 tablet by mouth daily  Dona Patel MD   vitamin B-12 (CYANOCOBALAMIN) 500 MCG tablet Take 1 tablet by mouth daily  Edita Aguilar MD   vitamin E 100 units capsule Take 1 capsule by mouth daily  Edita Aguilar MD   umeclidinium-vilanterol (ANORO ELLIPTA) 62.5-25 MCG/INH AEPB inhaler Inhale 1 puff into the lungs daily  Sanjuanita Marsh MD   fluticasone (FLONASE) 50 MCG/ACT nasal spray 1 spray by Each Nostril route daily  Sanjuanita Marsh MD   Respiratory Therapy Supplies TAVO New CPAP mask , humidified chamber and supplies  Sanjuanita Marsh MD   furosemide (LASIX) 20 MG tablet take 1 tablet by mouth once daily  Reg Monroe MD   loratadine (CLARITIN) 10 MG tablet Take 1 tablet by mouth nightly as needed (allergies)  Mery Cuevas PA-C   blood glucose monitor kit and supplies Test 2 times a day & as needed for symptoms of irregular blood glucose. Edita Aguilar MD   isosorbide mononitrate (IMDUR) 120 MG extended release tablet take 1 tablet by mouth once daily  Reg Monroe MD   ezetimibe (ZETIA) 10 MG tablet Take 10 mg by mouth daily  Historical Provider, MD   lidocaine (LIDODERM) 5 % Place 1 patch onto the skin daily 12 hours on, 12 hours off. Dona Yanez MD   Omega-3 Fatty Acids (OMEGA 3 PO) Take 120 mg by mouth daily. Historical Provider, MD   fluticasone (FLONASE) 50 MCG/ACT nasal spray 2 sprays by Nasal route daily. Historical Provider, MD   nitroGLYCERIN (NITROQUICK) 0.4 MG SL tablet Place 1 tablet under the tongue every 5 minutes as needed. Reg Monroe MD   Calcium Carb-Cholecalciferol (OYSTER SHELL CALCIUM + D PO) Take 1 tablet by mouth daily.     Historical Provider, MD   ONE TOUCH ULTRASOFT LANCETS Test once daily and as directed   Historical Provider, MD       Social History     Tobacco Use    Smoking status: Former Smoker     Last attempt to quit: 4/10/2007     Years since quittin.5    Smokeless tobacco: Never Used   Substance Use Topics    Alcohol use: No    Drug use: No        No Known Allergies,   Past Medical History:   Diagnosis Date    Bilateral bunions 2018    CAD (coronary artery disease)     [address]    Calcific tendinitis of left shoulder region 2019    Dr Kip Weinberg    Cholelithiasis 2015    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     Diabetes mellitus type II     DJD (degenerative joint disease) of knee     Dr Kip Weinberg     Emphysema lung ([address]     H/O colonoscopy 10/02/2012    dr Marita Marte H/O mammogram ,     HTN (hypertension)     Learning disability     on SSI    Osteoarthritis of shoulder     Osteopenia 2015    S/P left mastectomy 1985    cancer, no rx    Senile osteopenia     Tubular adenoma of colon    ,   Past Surgical History:   Procedure Laterality Date     SECTION  x 3    MASTECTOMY, RADICAL Left     breast cancer   ,   Social History     Tobacco Use    Smoking status: Former Smoker     Last attempt to quit: 4/10/2007     Years since quittin.5    Smokeless tobacco: Never Used   Substance Use Topics    Alcohol use: No    Drug use: No   ,   Family History   Problem Relation Age of Onset    Diabetes Mother         dec age 80   ,   Immunization History   Administered Date(s) Administered    Influenza 2013    Influenza, Quadv, IM, (6 mo and older Fluzone, Flulaval, Fluarix and 3 yrs and older Afluria) 2013    Pneumococcal Conjugate 13-valent (Bharti Cartagena) 2017   ,   Health Maintenance   Topic Date Due    DTaP/Tdap/Td vaccine (1 - Tdap) 1953    Shingles Vaccine (1 of 2) 1984    Diabetic retinal exam  2016    Pneumococcal 65+ years Vaccine (2 of 2 - PPSV23) 2018    Diabetic foot exam  2019    A1C test (Diabetic or Prediabetic)  2019    Lipid screen  02/25/2020    Flu vaccine (1) 09/01/2020    Annual Wellness Visit (AWV)  06/23/2021    Potassium monitoring  10/26/2021    Creatinine monitoring  10/26/2021    Colon cancer screen colonoscopy  10/02/2022    DEXA (modify frequency per FRAX score)  Addressed    Hepatitis A vaccine  Aged Out    Hib vaccine  Aged Out    Meningococcal (ACWY) vaccine  Aged Out       PHYSICAL EXAMINATION:  [ INSTRUCTIONS:  \"[x]\" Indicates a positive item  \"[]\" Indicates a negative item  -- DELETE ALL ITEMS NOT EXAMINED]  [x] Alert  [x] Oriented to person/place/time    [x] No apparent distress  [] Toxic appearing    [] Face flushed appearing [] Sclera clear  [] Lips are cyanotic      [x] Breathing appears normal  [] Appears tachypneic      [] Rash on visible skin    [] Cranial Nerves II-XII grossly intact    [] Motor grossly intact in visible upper extremities    [] Motor grossly intact in visible lower extremities    [x] Normal Mood  [] Anxious appearing    [] Depressed appearing  [] Confused appearing      [] Poor short term memory  [] Poor long term memory    [] OTHER:      Due to this being a TeleHealth encounter, evaluation of the following organ systems is limited: Vitals/Constitutional/EENT/Resp/CV/GI//MS/Neuro/Skin/Heme-Lymph-Imm.     Lab Results   Component Value Date     10/26/2020    K 5.6 (H) 10/26/2020     10/26/2020    CO2 23 10/26/2020    BUN 34 (H) 10/26/2020    CREATININE 1.67 (H) 10/26/2020    GLUCOSE 149 (H) 10/26/2020    CALCIUM 10.0 (H) 10/26/2020    PROT 6.6 01/15/2015    LABALBU 4.0 09/09/2020    BILITOT 0.2 01/15/2015    ALKPHOS 65 01/15/2015    AST 16 01/15/2015    ALT 12 01/15/2015    LABGLOM 29.1 (L) 10/26/2020    GFRAA 35.2 (L) 10/26/2020    GLOB 2.3 01/15/2015       Lab Results   Component Value Date    WBC 6.7 09/09/2020    HGB 14.5 09/09/2020    HCT 43.0 09/09/2020    MCV 92.3 09/09/2020     09/09/2020     Lab Results   Component Value Date    LABA1C 7.1 10/16/2018     No results found for: EAG      ASSESSMENT/PLAN:    1. Post-menopausal    - DEXA BONE DENSITY AXIAL SKELETON; Future    2. 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 Essential hypertension    - spironolactone (ALDACTONE) 25 MG tablet; Take 1 tablet by mouth daily  Dispense: 90 tablet; Refill: 1    4. Coronary artery disease, angina presence unspecified, unspecified vessel or lesion type, unspecified whether native or transplanted heart    - spironolactone (ALDACTONE) 25 MG tablet; Take 1 tablet by mouth daily  Dispense: 90 tablet; Refill: 1    5. BRANT on CPAP  Continue management. 6. Osteoporosis, unspecified osteoporosis type, unspecified pathological fracture presence    - DEXA BONE DENSITY AXIAL SKELETON; Future  - zoledronic acid (RECLAST) 5 MG/100ML SOLN; Infuse 100 mLs intravenously once for 1 dose  Dispense: 100 mL; Refill: 0    7. Osteoarthritis, unspecified osteoarthritis type, unspecified site  Patient told to stop using Aleve as she has some kidney impairment. Can take extra strength Tylenol to help with arthritis. - acetaminophen (TYLENOL) 500 MG tablet; Take 1 tablet by mouth every 6 hours as needed for Pain  Dispense: 120 tablet; Refill: 5  - Multiple Vitamin (MVI, CELEBRATE, CHEWABLE TABLET); Take 1 tablet by mouth daily  Dispense: 30 tablet; Refill: 2          Return in about 3 months (around 1/26/2021). An  electronic signature was used to authenticate this note. --Vickie Beverly MD on 10/27/2020 at 12:56 PM    Silvia Gamboa is a 80 y.o. female evaluated via telephone on 10/26/2020. Consent:  She and/or health care decision maker is aware that that she may receive a bill for this telephone service, depending on her insurance coverage, and has provided verbal consent to proceed: Yes      Documentation:  I communicated with the patient and/or health care decision maker about chronic medical conditions.    Details of this discussion including any medical advice provided: yes      I affirm this is a Patient Initiated Episode with a Patient who has not had a related appointment within my department in the past 7 days or scheduled within the next 24 hours. Patient identification was verified at the start of the visit: Yes    Total Time: minutes: 11-20 minutes    Note: not billable if this call serves to triage the patient into an appointment for the relevant concern      Abelino Waltons to the emergency declaration under the [address] [place] [address] waiver authority and the [employer] Act, this Virtual  Visit was conducted, with patient's consent, to reduce the patient's risk of exposure to COVID-19 and provide continuity of care for an established patient. Services were provided through a video synchronous discussion virtually to substitute for in-person clinic visit. I spent greater than 25 minutes in this visit, with more than 50 % of the time devoted to the patient counseling regarding patients concerns, evaluation, prognosis, explanation of diagnosis, risks, and benefits of treatments.

## 2020-10-28 LAB
ANION GAP SERPL CALCULATED.3IONS-SCNC: 12 MEQ/L (ref 9–15)
BUN BLDV-MCNC: 39 MG/DL (ref 8–23)
CHLORIDE BLD-SCNC: 106 MEQ/L (ref 95–107)
CO2: 22 MEQ/L (ref 20–31)
CREAT SERPL-MCNC: 1.6 MG/DL (ref 0.5–0.9)
GFR AFRICAN AMERICAN: 37
GFR NON-AFRICAN AMERICAN: 30.6
POTASSIUM SERPL-SCNC: 5 MEQ/L (ref 3.4–4.9)
SODIUM BLD-SCNC: 140 MEQ/L (ref 135–144)

## 2020-10-30 DIAGNOSIS — M81.0 SENILE OSTEOPOROSIS: ICD-10-CM

## 2020-10-30 RX ORDER — ZOLEDRONIC ACID 5 MG/100ML
5 INJECTION, SOLUTION INTRAVENOUS ONCE
Status: CANCELLED | OUTPATIENT
Start: 2020-10-30

## 2020-11-06 LAB
ANION GAP SERPL CALCULATED.3IONS-SCNC: 12 MEQ/L (ref 9–15)
BUN BLDV-MCNC: 30 MG/DL (ref 8–23)
CHLORIDE BLD-SCNC: 102 MEQ/L (ref 95–107)
CO2: 23 MEQ/L (ref 20–31)
CREAT SERPL-MCNC: 1.32 MG/DL (ref 0.5–0.9)
GFR AFRICAN AMERICAN: 46.2
GFR NON-AFRICAN AMERICAN: 38.2
POTASSIUM SERPL-SCNC: 4.7 MEQ/L (ref 3.4–4.9)
SODIUM BLD-SCNC: 137 MEQ/L (ref 135–144)

## 2020-11-09 ENCOUNTER — VIRTUAL VISIT (OUTPATIENT)
Dept: PULMONOLOGY | Age: 85
End: 2020-11-09
Payer: COMMERCIAL

## 2020-11-09 PROCEDURE — 99443 PR PHYS/QHP TELEPHONE EVALUATION 21-30 MIN: CPT | Performed by: INTERNAL MEDICINE

## 2020-11-09 ASSESSMENT — ENCOUNTER SYMPTOMS
EYE ITCHING: 0
VOMITING: 0
RHINORRHEA: 0
EYE DISCHARGE: 0
TROUBLE SWALLOWING: 0
NAUSEA: 0
SHORTNESS OF BREATH: 0
CHEST TIGHTNESS: 0
COUGH: 0
SINUS PRESSURE: 0
WHEEZING: 0
DIARRHEA: 0
SORE THROAT: 0
VOICE CHANGE: 0
ABDOMINAL PAIN: 0

## 2020-11-09 NOTE — PROGRESS NOTES
Subjective:     Elana Street is a 80 y.o. female who complains today of:     Chief Complaint   Patient presents with    Follow-up       HPI  She is using CPAP  with heated humidity. She is using CPAP for about  4-8  hours every night. She is using CPAP with   Nasal  Mask. She said  sleep is restful with the CPAP use. She is compliant with CPAP therapy and benefiting with CPAP use. No snoring with CPAP use. No complaint of daytime sleepiness or tiredness with CPAP use. She denies difficulty falling asleep or staying asleep. Allergies:  Patient has no known allergies.   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     Diabetes mellitus type II     DJD (degenerative joint disease) of knee     Dr Montserrat Ramirez     Emphysema lung ([facility] [address]     H/O colonoscopy 10/02/2012    dr Tony Gonzalez H/O mammogram ,     HTN (hypertension)     Learning disability     on SSI    Osteoarthritis of shoulder 2019    Osteopenia 2015    S/P left mastectomy 1985    cancer, no rx    Senile osteopenia     Tubular adenoma of colon      Past Surgical History:   Procedure Laterality Date     SECTION  x 3    MASTECTOMY, RADICAL Left     breast cancer     Family History   Problem Relation Age of Onset    Diabetes Mother         dec age 80     Social History     Socioeconomic History    Marital status:      Spouse name: Not on file    Number of children: 3    Years of education: Not on file    Highest education level: Not on file   Occupational History    Occupation: homemaker, SSI for learning disability   Social Needs    Financial resource strain: Not on file    Food insecurity     Worry: Not on file     Inability: Not on file    Transportation needs     Medical: Not on file     Non-medical: Not on file   Tobacco Use    Smoking status: Former Smoker     Last attempt to quit: 4/10/2007     Years since quittin.5    Smokeless tobacco: Never Used   Substance and Sexual Activity    Alcohol use: No    Drug use: No    Sexual activity: Not on file   Lifestyle    Physical activity     Days per week: Not on file     Minutes per session: Not on file    Stress: Not on file   Relationships    Social connections     Talks on phone: Not on file     Gets together: Not on file     Attends Church service: Not on file     Active member of club or organization: Not on file     Attends meetings of clubs or organizations: Not on file     Relationship status: Not on file    Intimate partner violence     Fear of current or ex partner: Not on file     Emotionally abused: Not on file     Physically abused: Not on file     Forced sexual activity: Not on file   Other Topics Concern    Not on file   Social History Narrative    Born in [place], older of 3 children, came to the [address] [place] [address] at age 23, sister and brother stayed in [place]    , homemaker, 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 HENT: Negative for congestion, mouth sores, nosebleeds, postnasal drip, rhinorrhea, sinus pressure, sneezing, sore throat, trouble swallowing and voice change. Eyes: Negative for discharge, itching and visual disturbance. Respiratory: Negative for cough, chest tightness, shortness of breath and wheezing. Cardiovascular: Negative for chest pain, palpitations and leg swelling. Gastrointestinal: Negative for abdominal pain, diarrhea, nausea and vomiting. Genitourinary: Negative for difficulty urinating and hematuria.    Musculoskeletal: Negative for arthralgias, joint swelling and myalgias. Skin: Negative for rash. Allergic/Immunologic: Negative for environmental allergies and food allergies. Neurological: Negative for dizziness, tremors, weakness and headaches. Psychiatric/Behavioral: Positive for sleep disturbance. Negative for behavioral problems.         :   There were no vitals filed for this visit. Wt Readings from Last 3 Encounters:   10/18/19 140 lb (63.5 kg)   09/04/19 139 lb 9.6 oz (63.3 kg)   05/14/19 146 lb (66.2 kg)     Physical exam : phone visit    Current Outpatient Medications   Medication Sig Dispense Refill    spironolactone (ALDACTONE) 25 MG tablet Take 1 tablet by mouth daily 90 tablet 1    acetaminophen (TYLENOL) 500 MG tablet Take 1 tablet by mouth every 6 hours as needed for Pain 120 tablet 5    Multiple Vitamin (MVI, CELEBRATE, CHEWABLE TABLET) Take 1 tablet by mouth daily 30 tablet 2    zoledronic acid (RECLAST) 5 MG/100ML SOLN Infuse 100 mLs intravenously once for 1 dose 100 mL 0    metFORMIN (GLUCOPHAGE) 500 MG tablet take 1 tablet by mouth twice a day with meals 180 tablet 0    amLODIPine (NORVASC) 5 MG tablet take 1 tablet by mouth once daily 90 tablet 0    levothyroxine (SYNTHROID) 112 MCG tablet take 1 tablet by mouth every morning before A MEAL 90 tablet 1    aspirin (RA ASPIRIN ADULT LOW STRENGTH) 81 MG EC tablet take 2 tablets by mouth once daily 180 tablet 2    famotidine (PEPCID) 40 MG tablet Take 1 tablet by mouth nightly as needed (heartburn) 90 tablet 1    pravastatin (PRAVACHOL) 40 MG tablet Take 1 tablet by mouth daily 90 tablet 1    cloNIDine (CATAPRES) 0.3 MG tablet Take 1 tablet by mouth 2 times daily 180 tablet 1    lisinopril (PRINIVIL;ZESTRIL) 20 MG tablet take 1 tablet by mouth twice a day 180 tablet 1    nystatin (MYCOSTATIN) 032095 UNIT/GM cream Apply topically 2 times daily.  30 g 2    EZ SMART BLOOD GLUCOSE LANCETS MISC Test  1 to 2 times daily 100 each 0    blood glucose monitor strips Test 2 times a day & as needed for symptoms of irregular blood glucose. 100 strip 0    Cholecalciferol (VITAMIN D3) 25 MCG (1000 UT) TABS Take 1 tablet by mouth daily 90 tablet 1    vitamin B-12 (CYANOCOBALAMIN) 500 MCG tablet Take 1 tablet by mouth daily 90 tablet 1    vitamin E 100 units capsule Take 1 capsule by mouth daily 90 capsule 1    umeclidinium-vilanterol (ANORO ELLIPTA) 62.5-25 MCG/INH AEPB inhaler Inhale 1 puff into the lungs daily 1 each 2    fluticasone (FLONASE) 50 MCG/ACT nasal spray 1 spray by Each Nostril route daily 2 Bottle 1    Respiratory Therapy Supplies TAVO New CPAP mask , humidified chamber and supplies 1 Device 0    furosemide (LASIX) 20 MG tablet take 1 tablet by mouth once daily 90 tablet 1    loratadine (CLARITIN) 10 MG tablet Take 1 tablet by mouth nightly as needed (allergies) 30 tablet 1    blood glucose monitor kit and supplies Test 2 times a day & as needed for symptoms of irregular blood glucose. 1 kit 0    isosorbide mononitrate (IMDUR) 120 MG extended release tablet take 1 tablet by mouth once daily 90 tablet 1    ezetimibe (ZETIA) 10 MG tablet Take 10 mg by mouth daily      lidocaine (LIDODERM) 5 % Place 1 patch onto the skin daily 12 hours on, 12 hours off. 30 patch 0    Omega-3 Fatty Acids (OMEGA 3 PO) Take 120 mg by mouth daily.  fluticasone (FLONASE) 50 MCG/ACT nasal spray 2 sprays by Nasal route daily.  nitroGLYCERIN (NITROQUICK) 0.4 MG SL tablet Place 1 tablet under the tongue every 5 minutes as needed. 25 tablet 6    Calcium Carb-Cholecalciferol (OYSTER SHELL CALCIUM + D PO) Take 1 tablet by mouth daily.  ONE TOUCH ULTRASOFT LANCETS Test once daily and as directed        No current facility-administered medications for this visit.         Results for orders placed during the hospital encounter of 11/02/16   XR CHEST STANDARD TWO VW    Narrative EXAMINATION: CHEST TWO VIEWS  CLINICAL HISTORY: Short of breath  COMPARISONS: July 3, 2013  FINDINGS:  Two views of the chest are submitted. The cardiac silhouette is enlarged, unchanged configuration. The mediastinum is unremarkable. Pulmonary vascular unremarkable. Right sided trachea. No focal infiltrates. No effusions. No Pneumothoraces. Impression NO ACUTE ACTIVE CARDIOPULMONARY PROCESS       Assessment/Plan:     1. BRANT on CPAP  She is using CPAP  with heated humidity. She is using CPAP for about  4-8  hours every night. She is using CPAP with   Nasal  Mask. . She said  sleep is restful with the CPAP use. She is compliant with CPAP therapy and benefiting with CPAP use. No snoring with CPAP use. Counseling: CPAP/BiPAP uses, patient advised to use CPAP at least 5-6 hours every night. Sleep hygiene:Avoid supine sleep, sleep on  sides. Avoid  sleep deprivation. Explained sleep hygiene. Advice to avoid Alcohol and sedative    2. Overweight (BMI 25.0-29. 9)  Patient patient is advised try to lose weight. obesity related risk explained to the patient ,  Suggested weight control approaches, including dietary changes , exercise, behavioral modification. Patient notified that this is a billable service and has given verbal consent for telehealth services. Time spent with patient 21  minutes. Return in about 6 months (around 5/9/2021) for brant.       Anna Lindsey MD

## 2020-11-12 ENCOUNTER — HOSPITAL ENCOUNTER (OUTPATIENT)
Dept: INFUSION THERAPY | Age: 85
Setting detail: INFUSION SERIES
Discharge: HOME | End: 2020-11-12
Payer: COMMERCIAL

## 2020-11-12 VITALS
DIASTOLIC BLOOD PRESSURE: 61 MMHG | SYSTOLIC BLOOD PRESSURE: 146 MMHG | TEMPERATURE: 97.8 F | RESPIRATION RATE: 18 BRPM | HEART RATE: 59 BPM

## 2020-11-12 DIAGNOSIS — M81.0 SENILE OSTEOPOROSIS: Primary | ICD-10-CM

## 2020-11-12 PROCEDURE — 96365 THER/PROPH/DIAG IV INF INIT: CPT

## 2020-11-12 PROCEDURE — 6360000002 HC RX W HCPCS: Performed by: FAMILY MEDICINE

## 2020-11-12 RX ORDER — ZOLEDRONIC ACID 5 MG/100ML
5 INJECTION, SOLUTION INTRAVENOUS ONCE
Status: COMPLETED | OUTPATIENT
Start: 2020-11-12 | End: 2020-11-12

## 2020-11-12 RX ORDER — ZOLEDRONIC ACID 5 MG/100ML
5 INJECTION, SOLUTION INTRAVENOUS ONCE
Status: CANCELLED | OUTPATIENT
Start: 2020-11-12

## 2020-11-12 RX ADMIN — ZOLEDRONIC ACID 5 MG: 5 INJECTION, SOLUTION INTRAVENOUS at 12:00

## 2020-11-12 ASSESSMENT — PAIN DESCRIPTION - PAIN TYPE: TYPE: CHRONIC PAIN

## 2020-11-12 ASSESSMENT — PAIN SCALES - GENERAL: PAINLEVEL_OUTOF10: 5

## 2020-11-12 ASSESSMENT — PAIN DESCRIPTION - LOCATION: LOCATION: KNEE

## 2020-11-12 ASSESSMENT — PAIN DESCRIPTION - ORIENTATION: ORIENTATION: RIGHT

## 2020-11-18 RX ORDER — PRAVASTATIN SODIUM 40 MG
TABLET ORAL
Qty: 90 TABLET | Refills: 1 | Status: SHIPPED | OUTPATIENT
Start: 2020-11-18 | End: 2021-05-13

## 2020-11-18 RX ORDER — CLONIDINE HYDROCHLORIDE 0.3 MG/1
TABLET ORAL
Qty: 180 TABLET | Refills: 1 | Status: SHIPPED | OUTPATIENT
Start: 2020-11-18 | End: 2021-05-13

## 2020-12-18 ENCOUNTER — HOSPITAL ENCOUNTER (OUTPATIENT)
Dept: WOMENS IMAGING | Age: 85
Discharge: HOME | End: 2020-12-20
Payer: COMMERCIAL

## 2020-12-18 PROCEDURE — 77080 DXA BONE DENSITY AXIAL: CPT

## 2021-01-14 DIAGNOSIS — E11.21 CONTROLLED TYPE 2 DIABETES MELLITUS WITH PROTEINURIC DIABETIC NEPHROPATHY (HCC): ICD-10-CM

## 2021-02-22 LAB
ANION GAP SERPL CALCULATED.3IONS-SCNC: 8 MEQ/L (ref 9–15)
BUN BLDV-MCNC: 24 MG/DL (ref 8–23)
CHLORIDE BLD-SCNC: 105 MEQ/L (ref 95–107)
CHOLESTEROL, TOTAL: 170 MG/DL (ref 0–199)
CO2: 25 MEQ/L (ref 20–31)
CREAT SERPL-MCNC: 1.55 MG/DL (ref 0.5–0.9)
GFR AFRICAN AMERICAN: 38.3
GFR NON-AFRICAN AMERICAN: 31.7
HDLC SERPL-MCNC: 64 MG/DL (ref 40–59)
LDL CHOLESTEROL CALCULATED: 70 MG/DL (ref 0–129)
POTASSIUM SERPL-SCNC: 4.2 MEQ/L (ref 3.4–4.9)
SODIUM BLD-SCNC: 138 MEQ/L (ref 135–144)
TRIGL SERPL-MCNC: 182 MG/DL (ref 0–150)

## 2021-02-25 DIAGNOSIS — B37.31 CANDIDA VAGINITIS: Primary | ICD-10-CM

## 2021-02-25 RX ORDER — CLOTRIMAZOLE 1 %
1 CREAM WITH APPLICATOR VAGINAL NIGHTLY
Qty: 45 G | Refills: 2 | Status: SHIPPED | OUTPATIENT
Start: 2021-02-25 | End: 2021-03-04

## 2021-04-17 DIAGNOSIS — E11.21 CONTROLLED TYPE 2 DIABETES MELLITUS WITH PROTEINURIC DIABETIC NEPHROPATHY (HCC): ICD-10-CM

## 2021-05-13 RX ORDER — PRAVASTATIN SODIUM 40 MG
TABLET ORAL
Qty: 90 TABLET | Refills: 1 | Status: SHIPPED | OUTPATIENT
Start: 2021-05-13

## 2021-05-13 RX ORDER — CLONIDINE HYDROCHLORIDE 0.3 MG/1
TABLET ORAL
Qty: 180 TABLET | Refills: 1 | Status: SHIPPED | OUTPATIENT
Start: 2021-05-13 | End: 2021-10-06 | Stop reason: SDUPTHER

## 2021-06-09 DIAGNOSIS — J30.1 NON-SEASONAL ALLERGIC RHINITIS DUE TO POLLEN: ICD-10-CM

## 2021-06-09 NOTE — TELEPHONE ENCOUNTER
Patient requesting medication refill. Please approve or deny this request. Patient uses [facility] in [place]. Rx requested:  Requested Prescriptions     Pending Prescriptions Disp Refills    umeclidinium-vilanterol (ANORO ELLIPTA) 62.5-25 MCG/INH AEPB inhaler 1 each 2     Sig: Inhale 1 puff into the lungs daily    fluticasone (FLONASE) 50 MCG/ACT nasal spray 2 Bottle 1     Si spray by Each Nostril route daily         Last Office Visit:   2020      Next Visit Date:  No future appointments.

## 2021-06-10 RX ORDER — FLUTICASONE PROPIONATE 50 MCG
1 SPRAY, SUSPENSION (ML) NASAL DAILY
Qty: 2 BOTTLE | Refills: 1 | OUTPATIENT
Start: 2021-06-10

## 2021-06-10 RX ORDER — UMECLIDINIUM BROMIDE AND VILANTEROL TRIFENATATE 62.5; 25 UG/1; UG/1
1 POWDER RESPIRATORY (INHALATION) DAILY
Qty: 1 EACH | Refills: 2 | OUTPATIENT
Start: 2021-06-10

## 2021-06-24 RX ORDER — LEVOTHYROXINE SODIUM 112 UG/1
TABLET ORAL
Qty: 90 TABLET | Refills: 2 | Status: SHIPPED | OUTPATIENT
Start: 2021-06-24

## 2021-07-18 DIAGNOSIS — E11.21 CONTROLLED TYPE 2 DIABETES MELLITUS WITH PROTEINURIC DIABETIC NEPHROPATHY (HCC): ICD-10-CM

## 2021-08-20 ENCOUNTER — TELEPHONE (OUTPATIENT)
Dept: FAMILY MEDICINE CLINIC | Age: 86
End: 2021-08-20

## 2021-08-20 NOTE — TELEPHONE ENCOUNTER
Patient 's daughter calling. PT saw Dr. Sanabria Green yesterday and he wants to adjust her Metforin medication. Did the office receive notification for this change? ?  [facility] [phone]

## 2021-10-06 RX ORDER — CLONIDINE HYDROCHLORIDE 0.3 MG/1
0.3 TABLET ORAL 2 TIMES DAILY
Qty: 180 TABLET | Refills: 0 | Status: SHIPPED | OUTPATIENT
Start: 2021-10-06

## 2022-01-05 RX ORDER — CLONIDINE HYDROCHLORIDE 0.3 MG/1
TABLET ORAL
Qty: 180 TABLET | Refills: 0 | OUTPATIENT
Start: 2022-01-05

## 2023-05-18 ENCOUNTER — TELEPHONE (OUTPATIENT)
Dept: FAMILY MEDICINE CLINIC | Age: 88
End: 2023-05-18

## 2023-05-18 NOTE — TELEPHONE ENCOUNTER
Dr. Dafne Hilton office called in needing a referral for knee injection . I advised them that the patient would need to schedule an appt.